# Patient Record
Sex: MALE | Race: WHITE | HISPANIC OR LATINO | URBAN - METROPOLITAN AREA
[De-identification: names, ages, dates, MRNs, and addresses within clinical notes are randomized per-mention and may not be internally consistent; named-entity substitution may affect disease eponyms.]

---

## 2017-03-11 PROCEDURE — 99285 EMERGENCY DEPT VISIT HI MDM: CPT

## 2017-03-11 PROCEDURE — 71250 CT THORAX DX C-: CPT | Mod: 26

## 2017-03-11 PROCEDURE — 71010: CPT | Mod: 26

## 2017-03-12 ENCOUNTER — INPATIENT (INPATIENT)
Facility: HOSPITAL | Age: 42
LOS: 9 days | Discharge: HOME | End: 2017-03-22
Payer: MEDICAID

## 2017-03-12 PROCEDURE — 93970 EXTREMITY STUDY: CPT | Mod: 26

## 2017-03-13 ENCOUNTER — OUTPATIENT (OUTPATIENT)
Dept: OUTPATIENT SERVICES | Facility: HOSPITAL | Age: 42
LOS: 1 days | End: 2017-03-13

## 2017-03-13 PROCEDURE — 71020: CPT | Mod: 26

## 2017-03-14 ENCOUNTER — OUTPATIENT (OUTPATIENT)
Dept: OUTPATIENT SERVICES | Facility: HOSPITAL | Age: 42
LOS: 1 days | End: 2017-03-14

## 2017-03-15 ENCOUNTER — OUTPATIENT (OUTPATIENT)
Dept: OUTPATIENT SERVICES | Facility: HOSPITAL | Age: 42
LOS: 1 days | End: 2017-03-15

## 2017-03-16 ENCOUNTER — OUTPATIENT (OUTPATIENT)
Dept: OUTPATIENT SERVICES | Facility: HOSPITAL | Age: 42
LOS: 1 days | End: 2017-03-16

## 2017-03-16 PROCEDURE — 71020: CPT | Mod: 26

## 2017-03-17 ENCOUNTER — OUTPATIENT (OUTPATIENT)
Dept: OUTPATIENT SERVICES | Facility: HOSPITAL | Age: 42
LOS: 1 days | End: 2017-03-17

## 2017-03-18 ENCOUNTER — OUTPATIENT (OUTPATIENT)
Dept: OUTPATIENT SERVICES | Facility: HOSPITAL | Age: 42
LOS: 1 days | End: 2017-03-18

## 2017-03-18 PROCEDURE — 71020: CPT | Mod: 26

## 2017-03-19 ENCOUNTER — OUTPATIENT (OUTPATIENT)
Dept: OUTPATIENT SERVICES | Facility: HOSPITAL | Age: 42
LOS: 1 days | End: 2017-03-19

## 2017-03-20 ENCOUNTER — OUTPATIENT (OUTPATIENT)
Dept: OUTPATIENT SERVICES | Facility: HOSPITAL | Age: 42
LOS: 1 days | End: 2017-03-20

## 2017-03-21 ENCOUNTER — OUTPATIENT (OUTPATIENT)
Dept: OUTPATIENT SERVICES | Facility: HOSPITAL | Age: 42
LOS: 1 days | End: 2017-03-21

## 2017-03-21 PROCEDURE — 71260 CT THORAX DX C+: CPT | Mod: 26

## 2017-03-21 PROCEDURE — 74177 CT ABD & PELVIS W/CONTRAST: CPT | Mod: 26

## 2017-03-22 ENCOUNTER — OUTPATIENT (OUTPATIENT)
Dept: OUTPATIENT SERVICES | Facility: HOSPITAL | Age: 42
LOS: 1 days | End: 2017-03-22

## 2017-03-22 ENCOUNTER — INPATIENT (INPATIENT)
Facility: HOSPITAL | Age: 42
LOS: 9 days | Discharge: HOME HEALTH | DRG: 163 | End: 2017-04-01
Attending: HOSPITALIST | Admitting: HOSPITALIST
Payer: MEDICAID

## 2017-03-22 VITALS
HEIGHT: 68 IN | OXYGEN SATURATION: 98 % | WEIGHT: 139.99 LBS | HEART RATE: 67 BPM | RESPIRATION RATE: 16 BRPM | SYSTOLIC BLOOD PRESSURE: 81 MMHG | DIASTOLIC BLOOD PRESSURE: 53 MMHG | TEMPERATURE: 98 F

## 2017-03-22 DIAGNOSIS — I10 ESSENTIAL (PRIMARY) HYPERTENSION: ICD-10-CM

## 2017-03-22 DIAGNOSIS — J85.2 ABSCESS OF LUNG WITHOUT PNEUMONIA: ICD-10-CM

## 2017-03-22 DIAGNOSIS — E11.9 TYPE 2 DIABETES MELLITUS WITHOUT COMPLICATIONS: ICD-10-CM

## 2017-03-22 DIAGNOSIS — J86.9 PYOTHORAX WITHOUT FISTULA: ICD-10-CM

## 2017-03-22 LAB
ALBUMIN SERPL ELPH-MCNC: 2.3 G/DL — LOW (ref 3.3–5.2)
ALP SERPL-CCNC: 207 U/L — HIGH (ref 40–120)
ALT FLD-CCNC: 143 U/L — HIGH
ANION GAP SERPL CALC-SCNC: 11 MMOL/L — SIGNIFICANT CHANGE UP (ref 5–17)
APTT BLD: 29.9 SEC — SIGNIFICANT CHANGE UP (ref 27.5–37.4)
AST SERPL-CCNC: 42 U/L — HIGH
BASOPHILS # BLD AUTO: 0 K/UL — SIGNIFICANT CHANGE UP (ref 0–0.2)
BASOPHILS NFR BLD AUTO: 0.2 % — SIGNIFICANT CHANGE UP (ref 0–2)
BILIRUB SERPL-MCNC: 0.2 MG/DL — LOW (ref 0.4–2)
BUN SERPL-MCNC: 9 MG/DL — SIGNIFICANT CHANGE UP (ref 8–20)
CALCIUM SERPL-MCNC: 8.3 MG/DL — LOW (ref 8.6–10.2)
CHLORIDE SERPL-SCNC: 99 MMOL/L — SIGNIFICANT CHANGE UP (ref 98–107)
CO2 SERPL-SCNC: 27 MMOL/L — SIGNIFICANT CHANGE UP (ref 22–29)
CREAT SERPL-MCNC: 0.48 MG/DL — LOW (ref 0.5–1.3)
EOSINOPHIL # BLD AUTO: 0 K/UL — SIGNIFICANT CHANGE UP (ref 0–0.5)
EOSINOPHIL NFR BLD AUTO: 0.5 % — SIGNIFICANT CHANGE UP (ref 0–5)
GLUCOSE SERPL-MCNC: 150 MG/DL — HIGH (ref 70–115)
HCT VFR BLD CALC: 35.9 % — LOW (ref 42–52)
HGB BLD-MCNC: 12.1 G/DL — LOW (ref 14–18)
INR BLD: 1.09 RATIO — SIGNIFICANT CHANGE UP (ref 0.88–1.16)
LYMPHOCYTES # BLD AUTO: 1.7 K/UL — SIGNIFICANT CHANGE UP (ref 1–4.8)
LYMPHOCYTES # BLD AUTO: 19.8 % — LOW (ref 20–55)
MCHC RBC-ENTMCNC: 29.5 PG — SIGNIFICANT CHANGE UP (ref 27–31)
MCHC RBC-ENTMCNC: 33.7 G/DL — SIGNIFICANT CHANGE UP (ref 32–36)
MCV RBC AUTO: 87.6 FL — SIGNIFICANT CHANGE UP (ref 80–94)
MONOCYTES # BLD AUTO: 0.5 K/UL — SIGNIFICANT CHANGE UP (ref 0–0.8)
MONOCYTES NFR BLD AUTO: 5.3 % — SIGNIFICANT CHANGE UP (ref 3–10)
NEUTROPHILS # BLD AUTO: 6.5 K/UL — SIGNIFICANT CHANGE UP (ref 1.8–8)
NEUTROPHILS NFR BLD AUTO: 73.9 % — HIGH (ref 37–73)
PLATELET # BLD AUTO: 503 K/UL — HIGH (ref 150–400)
POTASSIUM SERPL-MCNC: 3.8 MMOL/L — SIGNIFICANT CHANGE UP (ref 3.5–5.3)
POTASSIUM SERPL-SCNC: 3.8 MMOL/L — SIGNIFICANT CHANGE UP (ref 3.5–5.3)
PROT SERPL-MCNC: 8 G/DL — SIGNIFICANT CHANGE UP (ref 6.6–8.7)
PROTHROM AB SERPL-ACNC: 12 SEC — SIGNIFICANT CHANGE UP (ref 9.8–12.7)
RBC # BLD: 4.1 M/UL — LOW (ref 4.6–6.2)
RBC # FLD: 13.8 % — SIGNIFICANT CHANGE UP (ref 11–15.6)
SODIUM SERPL-SCNC: 137 MMOL/L — SIGNIFICANT CHANGE UP (ref 135–145)
WBC # BLD: 8.8 K/UL — SIGNIFICANT CHANGE UP (ref 4.8–10.8)
WBC # FLD AUTO: 8.8 K/UL — SIGNIFICANT CHANGE UP (ref 4.8–10.8)

## 2017-03-22 PROCEDURE — 99285 EMERGENCY DEPT VISIT HI MDM: CPT

## 2017-03-22 PROCEDURE — 99223 1ST HOSP IP/OBS HIGH 75: CPT

## 2017-03-22 PROCEDURE — 93010 ELECTROCARDIOGRAM REPORT: CPT

## 2017-03-22 RX ORDER — INSULIN LISPRO 100/ML
VIAL (ML) SUBCUTANEOUS
Qty: 0 | Refills: 0 | Status: DISCONTINUED | OUTPATIENT
Start: 2017-03-22 | End: 2017-03-24

## 2017-03-22 RX ORDER — GLUCAGON INJECTION, SOLUTION 0.5 MG/.1ML
1 INJECTION, SOLUTION SUBCUTANEOUS ONCE
Qty: 0 | Refills: 0 | Status: DISCONTINUED | OUTPATIENT
Start: 2017-03-22 | End: 2017-03-24

## 2017-03-22 RX ORDER — ENOXAPARIN SODIUM 100 MG/ML
40 INJECTION SUBCUTANEOUS EVERY 24 HOURS
Qty: 0 | Refills: 0 | Status: DISCONTINUED | OUTPATIENT
Start: 2017-03-22 | End: 2017-03-23

## 2017-03-22 RX ORDER — DEXTROSE 50 % IN WATER 50 %
25 SYRINGE (ML) INTRAVENOUS ONCE
Qty: 0 | Refills: 0 | Status: DISCONTINUED | OUTPATIENT
Start: 2017-03-22 | End: 2017-03-24

## 2017-03-22 RX ORDER — SODIUM CHLORIDE 9 MG/ML
1000 INJECTION, SOLUTION INTRAVENOUS
Qty: 0 | Refills: 0 | Status: DISCONTINUED | OUTPATIENT
Start: 2017-03-22 | End: 2017-03-24

## 2017-03-22 RX ORDER — DEXTROSE 50 % IN WATER 50 %
12.5 SYRINGE (ML) INTRAVENOUS ONCE
Qty: 0 | Refills: 0 | Status: DISCONTINUED | OUTPATIENT
Start: 2017-03-22 | End: 2017-03-24

## 2017-03-22 RX ORDER — SODIUM CHLORIDE 9 MG/ML
1000 INJECTION INTRAMUSCULAR; INTRAVENOUS; SUBCUTANEOUS ONCE
Qty: 0 | Refills: 0 | Status: COMPLETED | OUTPATIENT
Start: 2017-03-22 | End: 2017-03-22

## 2017-03-22 RX ORDER — DEXTROSE 50 % IN WATER 50 %
1 SYRINGE (ML) INTRAVENOUS ONCE
Qty: 0 | Refills: 0 | Status: DISCONTINUED | OUTPATIENT
Start: 2017-03-22 | End: 2017-03-24

## 2017-03-22 RX ORDER — INSULIN GLARGINE 100 [IU]/ML
20 INJECTION, SOLUTION SUBCUTANEOUS AT BEDTIME
Qty: 0 | Refills: 0 | Status: DISCONTINUED | OUTPATIENT
Start: 2017-03-22 | End: 2017-03-23

## 2017-03-22 RX ADMIN — SODIUM CHLORIDE 2000 MILLILITER(S): 9 INJECTION INTRAMUSCULAR; INTRAVENOUS; SUBCUTANEOUS at 20:25

## 2017-03-22 RX ADMIN — INSULIN GLARGINE 20 UNIT(S): 100 INJECTION, SOLUTION SUBCUTANEOUS at 21:31

## 2017-03-22 RX ADMIN — ENOXAPARIN SODIUM 40 MILLIGRAM(S): 100 INJECTION SUBCUTANEOUS at 21:31

## 2017-03-22 RX ADMIN — Medication 2: at 21:31

## 2017-03-22 NOTE — H&P ADULT - ASSESSMENT
Molly Monson [id number] pmd none. Ctcx Dr Gold Id Dr Mark  42 y/o M w/hx alcohol abuse, recent dx new onset dm. Pt was transferred from [place] due to several areas right lung abscess, and moderate size loculated empyema. Pt was tx w/vanco/meropenem... Pt states that he was admitted x 10 days, prior to his admission, he admitted to be drunk..thereafter he came w/ hx of fever same evolution, and chills, associated w. productive cough, yellowish..and pleuritic cp, everytime he coughs... he also states loosing approx 10 lbs during the past month.. Denies sob,abd pain, dysuria, palpitations, weakness, headache or any further complaints. Exam;sbp 80's RL crackles, and hypovent. ct report: persistent ext consolidation RLL, several areas of lung abscess, mod size loculated right pleural effusion/empyema. .. Started on ivf, abx. Molly Monson [id number] pmd none. Ctcx Dr Gold Id Dr Mark  40 y/o M w/hx alcohol abuse, recent dx new onset dm. Pt was transferred from [place] due to several areas right lung abscess, and moderate size loculated empyema. Pt was tx w/vanco/meropenem... Pt states that he was admitted x 10 days, prior to his admission, he admitted to be drunk..thereafter he came w/ hx of fever same evolution, and chills, associated w. productive cough, yellowish..and pleuritic cp, everytime he coughs... he also states loosing approx 10 lbs during the past month.. Denies sob,abd pain, dysuria, palpitations, weakness, headache or any further complaints. Exam;sbp 80's RL crackles, and hypovent. ct report: persistent ext consolidation RLL, several areas of lung abscess, mod size loculated right pleural effusion/empyema. .. Ctcx PA evaluated pt recommended to cont/ same abx coverage w/vanco/meropenem, repeat b.c, plan for decortication (fri).

## 2017-03-22 NOTE — H&P ADULT - HISTORY OF PRESENT ILLNESS
42 y/o M w/hx recent dx new onset dm. Pt was transferred from [place] due to several areas right lung abscess, and moderate size loculated empyema. Pt was tx w/vanco/meropenem... Pt states that he was admitted x 10 dys. he came w/ hx of fever same evolution, and chills, associated w. productive cough, yellowish..and pleuritic cp, everytime he coughs... he also states loosing approx 10 lbs during the past month.. Denies sob,abd pain, dysuria, palpitations, weakness, headache or any further complaints. 42 y/o M w/hx recent dx new onset dm. Pt was transferred from [place] due to several areas right lung abscess, and moderate size loculated empyema. Pt was tx w/vanco/meropenem... Pt states that he was admitted x 10 dys. he came w/ hx of fever same evolution, and chills, associated w. productive cough, yellowish..and pleuritic cp, everytime he coughs... he also states loosing approx 10 lbs during the past month.. Denies sob,abd pain, dysuria, palpitations, weakness, headache, diarrhea or any further complaints.

## 2017-03-22 NOTE — ED ADULT TRIAGE NOTE - CHIEF COMPLAINT QUOTE
pt alert and awake x3, BIBA from [facility] accepted by Dr Milligan hospitalist, Dr Milligan and Dr Caicedo at bedside for eval

## 2017-03-22 NOTE — H&P ADULT - PROBLEM SELECTOR PLAN 1
sepsis w/ hypotension  ID consult// Cardthx consult dr robinson randall/meropenem, f/u b.c uc.  pt sbp was found 80's. bolus ivf given, responded..

## 2017-03-22 NOTE — ED PROVIDER NOTE - RESPIRATORY, MLM
diminshes breath sounds right lung normla breath sounds left lung trachea midline no accessory muscle use

## 2017-03-22 NOTE — ED PROVIDER NOTE - OBJECTIVE STATEMENT
pt sent from [facility] for pulmonary abscess sent from floor accpeted by dr wilcox. pt currently denies fever. denies HA or neck pain. no chest pain or sob. no abd pain. no n/v/d. no urinary f/u/d. no back pain. no motor or sensory deficits. denies drug use. no recent travel. no rash. no other acute issues symptoms or concerns he has a chornic productive cough green sputum

## 2017-03-23 ENCOUNTER — RESULT REVIEW (OUTPATIENT)
Age: 42
End: 2017-03-23

## 2017-03-23 DIAGNOSIS — J85.1 ABSCESS OF LUNG WITH PNEUMONIA: ICD-10-CM

## 2017-03-23 DIAGNOSIS — E11.628 TYPE 2 DIABETES MELLITUS WITH OTHER SKIN COMPLICATIONS: ICD-10-CM

## 2017-03-23 PROBLEM — Z00.00 ENCOUNTER FOR PREVENTIVE HEALTH EXAMINATION: Status: ACTIVE | Noted: 2017-03-23

## 2017-03-23 LAB
ABO RH CONFIRMATION: SIGNIFICANT CHANGE UP
ALBUMIN SERPL ELPH-MCNC: 2.3 G/DL — LOW (ref 3.3–5.2)
ALBUMIN SERPL ELPH-MCNC: 2.3 G/DL — LOW (ref 3.3–5.2)
ALP SERPL-CCNC: 176 U/L — HIGH (ref 40–120)
ALP SERPL-CCNC: 180 U/L — HIGH (ref 40–120)
ALT FLD-CCNC: 111 U/L — HIGH
ALT FLD-CCNC: 111 U/L — HIGH
ANION GAP SERPL CALC-SCNC: 11 MMOL/L — SIGNIFICANT CHANGE UP (ref 5–17)
APPEARANCE UR: CLEAR — SIGNIFICANT CHANGE UP
AST SERPL-CCNC: 30 U/L — SIGNIFICANT CHANGE UP
AST SERPL-CCNC: 30 U/L — SIGNIFICANT CHANGE UP
BASOPHILS # BLD AUTO: 0 K/UL — SIGNIFICANT CHANGE UP (ref 0–0.2)
BASOPHILS NFR BLD AUTO: 0.2 % — SIGNIFICANT CHANGE UP (ref 0–2)
BILIRUB DIRECT SERPL-MCNC: 0 MG/DL — SIGNIFICANT CHANGE UP (ref 0–0.3)
BILIRUB INDIRECT FLD-MCNC: 0.2 MG/DL — SIGNIFICANT CHANGE UP (ref 0.2–1)
BILIRUB SERPL-MCNC: 0.2 MG/DL — LOW (ref 0.4–2)
BILIRUB SERPL-MCNC: 0.2 MG/DL — LOW (ref 0.4–2)
BILIRUB UR-MCNC: NEGATIVE — SIGNIFICANT CHANGE UP
BLD GP AB SCN SERPL QL: SIGNIFICANT CHANGE UP
BUN SERPL-MCNC: 9 MG/DL — SIGNIFICANT CHANGE UP (ref 8–20)
CA-I BLD-SCNC: 1.17 MMOL/L — SIGNIFICANT CHANGE UP (ref 1.05–1.34)
CALCIUM SERPL-MCNC: 7.8 MG/DL — LOW (ref 8.6–10.2)
CHLORIDE SERPL-SCNC: 103 MMOL/L — SIGNIFICANT CHANGE UP (ref 98–107)
CO2 SERPL-SCNC: 23 MMOL/L — SIGNIFICANT CHANGE UP (ref 22–29)
COLOR SPEC: YELLOW — SIGNIFICANT CHANGE UP
CREAT SERPL-MCNC: 0.47 MG/DL — LOW (ref 0.5–1.3)
DIFF PNL FLD: NEGATIVE — SIGNIFICANT CHANGE UP
EOSINOPHIL # BLD AUTO: 0.1 K/UL — SIGNIFICANT CHANGE UP (ref 0–0.5)
EOSINOPHIL NFR BLD AUTO: 0.9 % — SIGNIFICANT CHANGE UP (ref 0–5)
GLUCOSE SERPL-MCNC: 164 MG/DL — HIGH (ref 70–115)
GLUCOSE UR QL: NEGATIVE MG/DL — SIGNIFICANT CHANGE UP
GRAM STN FLD: SIGNIFICANT CHANGE UP
HBA1C BLD-MCNC: 12.3 % — HIGH (ref 4–5.6)
HCT VFR BLD CALC: 33.4 % — LOW (ref 42–52)
HCV AB S/CO SERPL IA: 0.14 S/CO — SIGNIFICANT CHANGE UP
HCV AB SERPL-IMP: SIGNIFICANT CHANGE UP
HGB BLD-MCNC: 11.1 G/DL — LOW (ref 14–18)
HIV 1 & 2 AB SERPL IA.RAPID: SIGNIFICANT CHANGE UP
KETONES UR-MCNC: NEGATIVE — SIGNIFICANT CHANGE UP
LACTATE BLDV-MCNC: 1.1 MMOL/L — SIGNIFICANT CHANGE UP (ref 0.5–1.6)
LEUKOCYTE ESTERASE UR-ACNC: NEGATIVE — SIGNIFICANT CHANGE UP
LYMPHOCYTES # BLD AUTO: 2.4 K/UL — SIGNIFICANT CHANGE UP (ref 1–4.8)
LYMPHOCYTES # BLD AUTO: 29.7 % — SIGNIFICANT CHANGE UP (ref 20–55)
MAGNESIUM SERPL-MCNC: 2 MG/DL — SIGNIFICANT CHANGE UP (ref 1.8–2.5)
MAGNESIUM SERPL-MCNC: 2.1 MG/DL — SIGNIFICANT CHANGE UP (ref 1.8–2.5)
MCHC RBC-ENTMCNC: 29.2 PG — SIGNIFICANT CHANGE UP (ref 27–31)
MCHC RBC-ENTMCNC: 33.2 G/DL — SIGNIFICANT CHANGE UP (ref 32–36)
MCV RBC AUTO: 87.9 FL — SIGNIFICANT CHANGE UP (ref 80–94)
MONOCYTES # BLD AUTO: 0.5 K/UL — SIGNIFICANT CHANGE UP (ref 0–0.8)
MONOCYTES NFR BLD AUTO: 5.8 % — SIGNIFICANT CHANGE UP (ref 3–10)
NEUTROPHILS # BLD AUTO: 5.2 K/UL — SIGNIFICANT CHANGE UP (ref 1.8–8)
NEUTROPHILS NFR BLD AUTO: 63 % — SIGNIFICANT CHANGE UP (ref 37–73)
NITRITE UR-MCNC: NEGATIVE — SIGNIFICANT CHANGE UP
PH UR: 7 — SIGNIFICANT CHANGE UP (ref 4.8–8)
PHOSPHATE SERPL-MCNC: 2.8 MG/DL — SIGNIFICANT CHANGE UP (ref 2.4–4.7)
PLATELET # BLD AUTO: 473 K/UL — HIGH (ref 150–400)
POTASSIUM SERPL-MCNC: 4 MMOL/L — SIGNIFICANT CHANGE UP (ref 3.5–5.3)
POTASSIUM SERPL-SCNC: 4 MMOL/L — SIGNIFICANT CHANGE UP (ref 3.5–5.3)
PROT SERPL-MCNC: 7.2 G/DL — SIGNIFICANT CHANGE UP (ref 6.6–8.7)
PROT SERPL-MCNC: 7.3 G/DL — SIGNIFICANT CHANGE UP (ref 6.6–8.7)
PROT UR-MCNC: NEGATIVE MG/DL — SIGNIFICANT CHANGE UP
RBC # BLD: 3.8 M/UL — LOW (ref 4.6–6.2)
RBC # FLD: 13.9 % — SIGNIFICANT CHANGE UP (ref 11–15.6)
SODIUM SERPL-SCNC: 137 MMOL/L — SIGNIFICANT CHANGE UP (ref 135–145)
SP GR SPEC: 1 — LOW (ref 1.01–1.02)
SPECIMEN SOURCE: SIGNIFICANT CHANGE UP
TYPE + AB SCN PNL BLD: SIGNIFICANT CHANGE UP
UROBILINOGEN FLD QL: NEGATIVE MG/DL — SIGNIFICANT CHANGE UP
WBC # BLD: 8.2 K/UL — SIGNIFICANT CHANGE UP (ref 4.8–10.8)
WBC # FLD AUTO: 8.2 K/UL — SIGNIFICANT CHANGE UP (ref 4.8–10.8)

## 2017-03-23 PROCEDURE — 99223 1ST HOSP IP/OBS HIGH 75: CPT

## 2017-03-23 PROCEDURE — 99233 SBSQ HOSP IP/OBS HIGH 50: CPT

## 2017-03-23 PROCEDURE — 71260 CT THORAX DX C+: CPT | Mod: 26

## 2017-03-23 RX ORDER — VANCOMYCIN HCL 1 G
1000 VIAL (EA) INTRAVENOUS EVERY 8 HOURS
Qty: 0 | Refills: 0 | Status: DISCONTINUED | OUTPATIENT
Start: 2017-03-23 | End: 2017-03-24

## 2017-03-23 RX ORDER — MEROPENEM 1 G/30ML
INJECTION INTRAVENOUS
Qty: 0 | Refills: 0 | Status: DISCONTINUED | OUTPATIENT
Start: 2017-03-23 | End: 2017-03-24

## 2017-03-23 RX ORDER — ENOXAPARIN SODIUM 100 MG/ML
40 INJECTION SUBCUTANEOUS ONCE
Qty: 0 | Refills: 0 | Status: COMPLETED | OUTPATIENT
Start: 2017-03-23 | End: 2017-03-23

## 2017-03-23 RX ORDER — MEROPENEM 1 G/30ML
1000 INJECTION INTRAVENOUS EVERY 8 HOURS
Qty: 0 | Refills: 0 | Status: DISCONTINUED | OUTPATIENT
Start: 2017-03-23 | End: 2017-03-24

## 2017-03-23 RX ORDER — INSULIN GLARGINE 100 [IU]/ML
10 INJECTION, SOLUTION SUBCUTANEOUS AT BEDTIME
Qty: 0 | Refills: 0 | Status: DISCONTINUED | OUTPATIENT
Start: 2017-03-23 | End: 2017-03-24

## 2017-03-23 RX ORDER — MEROPENEM 1 G/30ML
1000 INJECTION INTRAVENOUS ONCE
Qty: 0 | Refills: 0 | Status: COMPLETED | OUTPATIENT
Start: 2017-03-23 | End: 2017-03-23

## 2017-03-23 RX ADMIN — ENOXAPARIN SODIUM 40 MILLIGRAM(S): 100 INJECTION SUBCUTANEOUS at 18:15

## 2017-03-23 RX ADMIN — Medication 250 MILLIGRAM(S): at 03:04

## 2017-03-23 RX ADMIN — Medication 4: at 18:15

## 2017-03-23 RX ADMIN — Medication 250 MILLIGRAM(S): at 18:31

## 2017-03-23 RX ADMIN — MEROPENEM 200 MILLIGRAM(S): 1 INJECTION INTRAVENOUS at 02:07

## 2017-03-23 RX ADMIN — MEROPENEM 200 MILLIGRAM(S): 1 INJECTION INTRAVENOUS at 18:31

## 2017-03-23 RX ADMIN — Medication 250 MILLIGRAM(S): at 13:35

## 2017-03-23 RX ADMIN — INSULIN GLARGINE 10 UNIT(S): 100 INJECTION, SOLUTION SUBCUTANEOUS at 21:23

## 2017-03-23 RX ADMIN — MEROPENEM 200 MILLIGRAM(S): 1 INJECTION INTRAVENOUS at 13:35

## 2017-03-23 RX ADMIN — Medication 6: at 21:24

## 2017-03-23 NOTE — CHART NOTE - NSCHARTNOTEFT_GEN_A_CORE
41 year old male with a PMH recent diagnosed new onset DM. Pt was transferred from [place] due to several areas right lung abscess & moderate size loculated empyema. Pt was treated with vano & meropenem.  Pt states that he was admitted for 10 days & he reported symptoms of fever same evolution & chills associated with productive cough, & pleuritic chest pain when he coughs.  Pt. scheduled for VATS & decortication tomorrow 3/24/17 as per Dr. Cervantes.

## 2017-03-23 NOTE — PROGRESS NOTE ADULT - SUBJECTIVE AND OBJECTIVE BOX
Patient is a 41y old  Male who presents with a chief complaint of fever, chills, cough (22 Mar 2017 20:10)  and he complains of pain in his right chest with deep breathing.  He is scheduled for a FLEXIBLE   BRONCHOSCOPY RIGHT VIDEO ASSISTED THORACIC SURGERY (VATS) DECORTICATION.  He is in an isolation room and are trying to r/o tuberculosis.    PAST MEDICAL HISTORY:  Healthy with some elevated blood sugars on this admission.      PAST SURGICAL HISTORY:  No past surgical history      MEDICATIONS  (STANDING):  insulin glargine Injectable (LANTUS) 20Unit(s) SubCutaneous at bedtime  insulin lispro (HumaLOG) corrective regimen sliding scale  SubCutaneous Before meals and at bedtime  dextrose 5%. 1000milliLiter(s) IV Continuous <Continuous>  dextrose 50% Injectable 12.5Gram(s) IV Push once  dextrose 50% Injectable 25Gram(s) IV Push once  dextrose 50% Injectable 25Gram(s) IV Push once  enoxaparin Injectable 40milliGRAM(s) SubCutaneous every 24 hours  vancomycin  IVPB 1000milliGRAM(s) IV Intermittent every 8 hours  meropenem IVPB  IV Intermittent   meropenem IVPB 1000milliGRAM(s) IV Intermittent every 8 hours    MEDICATIONS  (PRN):  dextrose Gel 1Dose(s) Oral once PRN Blood Glucose LESS THAN 70 milliGRAM(s)/deciliter  glucagon  Injectable 1milliGRAM(s) IntraMuscular once PRN Glucose LESS THAN 70 milligrams/deciliter  chlordiazePOXIDE 50milliGRAM(s) Oral every 1 hour PRN CIWA-Ar score 8 or greater      Allergies:  No Known Allergies      SOCIAL HISTORY:  The patient drinks beer occasionally but does not smoke or use illicit drugs.                          11.1   8.2   )-----------( 473      ( 23 Mar 2017 05:19 )             33.4       PT/INR - ( 22 Mar 2017 22:09 )   PT: 12.0 sec;   INR: 1.09 ratio         PTT - ( 22 Mar 2017 22:09 )  PTT:29.9 sec    23 Mar 2017 05:20    137    |  103    |  9.0    ----------------------------<  164    4.0     |  23.0   |  0.47     Ca    7.8        23 Mar 2017 05:20  Phos  2.8       23 Mar 2017 05:19  Mg     2.0       23 Mar 2017 05:19    TPro  7.3    /  Alb  2.3    /  TBili  0.2    /  DBili  x      /  AST  30     /  ALT  111    /  AlkPhos  176    23 Mar 2017 05:20      Height (cm): 167.6 (03-22 @ 22:34)  Weight (kg): 64.3 (03-22 @ 22:34)  BMI (kg/m2): 22.9 (03-22 @ 22:34)    EKG: 3/22/2017  Normal sinus rhythm  Prolonged QT  Abnormal ECG    ASA # = 2  Mallampati # =  1  (The patient has 2 loose teeth, # 7 & # 8.

## 2017-03-23 NOTE — PROGRESS NOTE ADULT - SUBJECTIVE AND OBJECTIVE BOX
LYNN MELISSA   ------------------------------  The patient was seen and evaluated for lung abscess.  The patient is in no acute distress.  Denied any chest pain, palpitations, shortness of breath, abdominal pain.  Mild pleuritic pain while coughing.    Vital Signs Last 24 Hrs  T(C): 37.1, Max: 37.1 ( @ 08:00)  T(F): 98.8, Max: 98.8 ( @ 08:00)  HR: 82 (67 - 91)  BP: 118/62 (81/53 - 118/62)  BP(mean): --  RR: 17 (16 - 17)  SpO2: 99% (97% - 99%)    PHYSICAL EXAMINATION:  ----------------------------------------  General appearance: NAD, Awake, Alert  HEENT: NCAT, Conjunctiva clear, EOMI, Pupils reactive  Neck: Supple, No JVD, No tenderness  Lungs: Clear to auscultation, Breath sound equal bilaterally, No wheezes, No rales  Cardiovascular: S1S2, Regular rhythm  Abdomen: Soft, Nontender, Nondistended, No guarding/rebound, Positive bowel sounds  Extremities: No clubbing, No cyanosis, No edema, No calf tenderness  Neuro: Strength equal bilaterally, No tremors  Psychiatric: Awake and oriented, No anxiety    CAPILLARY BLOOD GLUCOSE  118 (23 Mar 2017 08:00)  156 (22 Mar 2017 20:07)    LABS:  ------------------------------             11.1   8.2   )-----------( 473      ( 23 Mar 2017 05:19 )             33.4     23 Mar 2017 05:20    137    |  103    |  9.0    ----------------------------<  164    4.0     |  23.0   |  0.47     Ca    7.8        23 Mar 2017 05:20  Phos  2.8       23 Mar 2017 05:19  Mg     2.0       23 Mar 2017 05:19    TPro  7.3    /  Alb  2.3    /  TBili  0.2    /  DBili  x      /  AST  30     /  ALT  111    /  AlkPhos  176    23 Mar 2017 05:20    LIVER FUNCTIONS - ( 23 Mar 2017 05:20 )  Alb: 2.3 g/dL / Pro: 7.3 g/dL / ALK PHOS: 176 U/L / ALT: 111 U/L / AST: 30 U/L / GGT: x           PT/INR - ( 22 Mar 2017 22:09 )   PT: 12.0 sec;   INR: 1.09 ratio    PTT - ( 22 Mar 2017 22:09 )  PTT:29.9 sec    Urinalysis Basic - ( 23 Mar 2017 00:44 )  Color: Yellow / Appearance: Clear / S.005 / pH: x  Gluc: x / Ketone: Negative  / Bili: Negative / Urobili: Negative mg/dL   Blood: x / Protein: Negative mg/dL / Nitrite: Negative   Leuk Esterase: Negative / RBC: x / WBC x   Sq Epi: x / Non Sq Epi: x / Bacteria: x    MEDICATIONS  (STANDING):  insulin glargine Injectable (LANTUS) 20Unit(s) SubCutaneous at bedtime  insulin lispro (HumaLOG) corrective regimen sliding scale  SubCutaneous Before meals and at bedtime  dextrose 5%. 1000milliLiter(s) IV Continuous <Continuous>  dextrose 50% Injectable 12.5Gram(s) IV Push once  dextrose 50% Injectable 25Gram(s) IV Push once  dextrose 50% Injectable 25Gram(s) IV Push once  enoxaparin Injectable 40milliGRAM(s) SubCutaneous every 24 hours  vancomycin  IVPB 1000milliGRAM(s) IV Intermittent every 8 hours  meropenem IVPB  IV Intermittent   meropenem IVPB 1000milliGRAM(s) IV Intermittent every 8 hours    MEDICATIONS  (PRN):  dextrose Gel 1Dose(s) Oral once PRN Blood Glucose LESS THAN 70 milliGRAM(s)/deciliter  glucagon  Injectable 1milliGRAM(s) IntraMuscular once PRN Glucose LESS THAN 70 milligrams/deciliter  chlordiazePOXIDE 50milliGRAM(s) Oral every 1 hour PRN CIWA-Ar score 8 or greater      ASSESSMENT / PLAN:  -----------------------------------  Lung abscess - Infectious Disease and Cardiothoracic Surgery consultation noted. Reported to have had negative AFB at [facility]. On intravenous antibiotics. Cultures and QuantiFeron Gold pending.    Diabetes - Insulin coverage, close monitoring of blood glucose levels.    HTN - Close blood pressure monitoring.

## 2017-03-23 NOTE — CONSULT NOTE ADULT - ASSESSMENT
FOR VATS FOR POSS EMPYEMA  LIMITED HX AVAILABLE  UNCLEAR IF LUNG ABSCEESS FROM POOR TEETH AND OR ASPIRATION  ? IF TB FULLY R/O - HX OF NEG AFB X 3 FROM PULM MD AT [place]  ? IF QF GOLD DONE  CONT IV ABS  CHK NEW CT AND QF GOLD  OK FOR VATS WITH THREE NEG AFB BUT DOES NOT R/O TB EMPYEMA  D/W DR. BUNN RE WAITING TILL QF GOLD BACK BEFORE OR BUT WITH THREE NEG AFB, PT NOT INFECTIOUS  IF VATS NEEDED THEN OK TO Proceed  IF NOT URGENT WOULD WAIT TILL QF GOLD BACK ALTHOUGH SURGERY IS STILL INDICATED  APPROP ISOLATION REQUIRED NOW AND IN OR TILL TB R/O.  I SEE NO ABSOLUTE CONTRA INDICATION FOR PROCEEDING WITH VATS FOR VATS FOR POSS EMPYEMA  LIMITED HX AVAILABLE  UNCLEAR IF LUNG ABSCEESS FROM POOR TEETH AND OR ASPIRATION  ? IF TB FULLY R/O - HX OF NEG AFB X 3 FROM PULM MD AT [place]  ? IF QF GOLD DONE  CT [place] WITH BOTH ABSCESS AND EMPYEMA  CONT IV ABS  CHK NEW CT AND QF GOLD  OK FOR VATS WITH THREE NEG AFB BUT DOES NOT R/O TB EMPYEMA  D/W DR. BUNN RE WAITING TILL QF GOLD BACK BEFORE OR BUT WITH THREE NEG AFB, PT NOT INFECTIOUS  IF VATS NEEDED THEN OK TO Proceed  IF NOT URGENT WOULD WAIT TILL QF GOLD BACK ALTHOUGH SURGERY IS STILL INDICATED  APPROP ISOLATION REQUIRED NOW AND IN OR TILL TB R/O.  I SEE NO ABSOLUTE CONTRA INDICATION FOR PROCEEDING WITH VATS

## 2017-03-23 NOTE — CONSULT NOTE ADULT - SUBJECTIVE AND OBJECTIVE BOX
[facility] INFECTIOUS DISEASES AND INTERNAL MEDICINE OF [employer] SARAH WHITTINGTON MD FACP   LITTLE RICHTER MD  Diplomates American Board of Internal Medicine and Infecctious Diseases      MRN-[id number]  LYNN MELISSA is a 41y  Male     CC:  HX WITH .   TRANSF FROM [facility]  NO RECORSDS AVAILABLE AND HX IN CHART SPOTTY  APPARWENTLY PRESENTED FEELING POORLY AND HOSP 11 DAYS ON IV ABS WITH LUNG ABSCSS/EMPYEMA  DR. BUNN TOLD AFB X 3 NEG BUT ? QF GOLD  PT DENIES FH TB OR PERSONAL HX TB  IN [facility] FROM [facility] SINCE   ADMITTED WITH CACHEXIA AND WEIGHT LOSS  NO OTHER HX AVAILABLE  NO LABS/CT AVAILABLE BENJAMIN  FOR VATS    Past Medical & Surgical Hx:  PAST MEDICAL & SURGICAL HISTORY:  Diabetes  No significant past surgical history      Problem List:  HEALTH ISSUES - PROBLEM Dx:  Hypertension: Hypertension  Diabetes: Diabetes  Empyema lung: Empyema lung  Pulmonary abscess: Pulmonary abscess            Allergies    No Known Allergies    Intolerances          ANTIBIOTICS:   vancomycin  IVPB 1000milliGRAM(s) IV Intermittent every 8 hours  meropenem IVPB  IV Intermittent   meropenem IVPB 1000milliGRAM(s) IV Intermittent every 8 hours       Review of Systems: - Negative except as mentioned below  NO SPUTA    Physical Exam:    Vital Signs Last 24 Hrs  T(C): 37.1, Max: 37.1 ( @ 08:00)  T(F): 98.8, Max: 98.8 ( @ 08:00)  HR: 82 (67 - 91)  BP: 118/62 (81/53 - 118/62)  BP(mean): --  RR: 17 (16 - 17)  SpO2: 99% (97% - 99%)    Height (cm): 167.6 ( @ 22:34)  Weight (kg): 64.3 ( @ 22:34)  BMI (kg/m2): 22.9 ( @ 22:34)  BSA (m2): 1.73 ( @ 22:34)    GEN: NAD, pleasantCACJECTIC  HEENT: normocephalic and atraumatic. EOMI. NANCY. Moist mucosa. Clear Posterior pharynx.  NECK: Supple. No carotid bruits.  No lymphadenopathy or thyromegaly.BAD TEETH  LUNGS: Clear to auscultation.DECR BSS R  HEART: Regular rate and rhythm without murmur.  ABDOMEN: Soft, nontender, and nondistended.  Positive bowel sounds.  No hepatosplenomegaly was noted.  EXTREMITIES: Without any cyanosis, clubbing, rash, lesions or edema.  NEUROLOGIC: Cranial nerves II through XII are grossly intact.  MUSCULOSKELETAL:ALERT NO LN  SKIN: No ulceration or induration present.      Labs:                        11.1   8.2   )-----------( 473      ( 23 Mar 2017 05:19 )             33.4     23 Mar 2017 05:20    137    |  103    |  9.0    ----------------------------<  164    4.0     |  23.0   |  0.47     Ca    7.8        23 Mar 2017 05:20  Phos  2.8       23 Mar 2017 05:19  Mg     2.0       23 Mar 2017 05:19    TPro  7.3    /  Alb  2.3    /  TBili  0.2    /  DBili  x      /  AST  30     /  ALT  111    /  AlkPhos  176    23 Mar 2017 05:20    PT/INR - ( 22 Mar 2017 22:09 )   PT: 12.0 sec;   INR: 1.09 ratio         PTT - ( 22 Mar 2017 22:09 )  PTT:29.9 sec  Urinalysis Basic - ( 23 Mar 2017 00:44 )    Color: Yellow / Appearance: Clear / S.005 / pH: x  Gluc: x / Ketone: Negative  / Bili: Negative / Urobili: Negative mg/dL   Blood: x / Protein: Negative mg/dL / Nitrite: Negative   Leuk Esterase: Negative / RBC: x / WBC x   Sq Epi: x / Non Sq Epi: x / Bacteria: x        Hematocrit: 33.4 % ( @ 05:19)  Platelet Count - Automated: 473 K/uL ( @ 05:19)  Hemoglobin: 11.1 g/dL ( @ 05:19)  WBC Count: 8.2 K/uL ( @ 05:19)  Hematocrit: 35.9 % ( @ 22:09)  Platelet Count - Automated: 503 K/uL ( @ 22:09)  Hemoglobin: 12.1 g/dL ( @ 22:09)  WBC Count: 8.8 K/uL ( @ 22:09)    Potassium, Serum: 4.0 mmol/L ( @ 05:20)  Blood Urea Nitrogen, Serum: 9.0 mg/dL ( @ 05:20)  Sodium, Serum: 137 mmol/L ( @ 05:20)  Potassium, Serum: 3.8 mmol/L ( @ 22:09)  Blood Urea Nitrogen, Serum: 9.0 mg/dL ( @ 22:09)  Sodium, Serum: 137 mmol/L ( @ 22:09)          MICROBIOLOGY        RADIOLOGY  PENDING            RALEIGH WHITTINGTON MD FACP [facility] INFECTIOUS DISEASES AND INTERNAL MEDICINE OF [place] SARAH WHITTINGTON MD FACP   LITTLE RICHTER MD  Diplomates American Board of Internal Medicine and Infecctious Diseases      MRN-[id number]  LYNN MELISSA is a 41y  Male     CC:  HX WITH .   TRANSF FROM [facility]  NO RECORSDS AVAILABLE ANDCT REPORT AVAILABLE  APPARWENTLY PRESENTED FEELING POORLY AND HOSP 11 DAYS ON IV ABS WITH LUNG ABSCSS/EMPYEMA  DR. BUNN TOLD AFB X 3 NEG BUT ? QF GOLD  PT DENIES FH TB OR PERSONAL HX TB  IN [facility] FROM [facility] SINCE   ADMITTED WITH CACHEXIA AND WEIGHT LOSS  NO OTHER HX AVAILABLE  /CT AVAILABLE NOW ONN PACS  FOR VATS    Past Medical & Surgical Hx:  PAST MEDICAL & SURGICAL HISTORY:  Diabetes  No significant past surgical history      Problem List:  HEALTH ISSUES - PROBLEM Dx:  Hypertension: Hypertension  Diabetes: Diabetes  Empyema lung: Empyema lung  Pulmonary abscess: Pulmonary abscess            Allergies    No Known Allergies    Intolerances          ANTIBIOTICS:   vancomycin  IVPB 1000milliGRAM(s) IV Intermittent every 8 hours  meropenem IVPB  IV Intermittent   meropenem IVPB 1000milliGRAM(s) IV Intermittent every 8 hours       Review of Systems: - Negative except as mentioned below  NO SPUTA    Physical Exam:    Vital Signs Last 24 Hrs  T(C): 37.1, Max: 37.1 ( @ 08:00)  T(F): 98.8, Max: 98.8 ( @ 08:00)  HR: 82 (67 - 91)  BP: 118/62 (81/53 - 118/62)  BP(mean): --  RR: 17 (16 - 17)  SpO2: 99% (97% - 99%)    Height (cm): 167.6 ( @ 22:34)  Weight (kg): 64.3 ( @ 22:34)  BMI (kg/m2): 22.9 ( @ 22:34)  BSA (m2): 1.73 ( @ 22:34)    GEN: NAD, pleasantCACJECTIC  HEENT: normocephalic and atraumatic. EOMI. NANCY. Moist mucosa. Clear Posterior pharynx.  NECK: Supple. No carotid bruits.  No lymphadenopathy or thyromegaly.BAD TEETH  LUNGS: Clear to auscultation.DECR BSS R  HEART: Regular rate and rhythm without murmur.  ABDOMEN: Soft, nontender, and nondistended.  Positive bowel sounds.  No hepatosplenomegaly was noted.  EXTREMITIES: Without any cyanosis, clubbing, rash, lesions or edema.  NEUROLOGIC: Cranial nerves II through XII are grossly intact.  MUSCULOSKELETAL:ALERT NO LN  SKIN: No ulceration or induration present.      Labs:                        11.1   8.2   )-----------( 473      ( 23 Mar 2017 05:19 )             33.4     23 Mar 2017 05:20    137    |  103    |  9.0    ----------------------------<  164    4.0     |  23.0   |  0.47     Ca    7.8        23 Mar 2017 05:20  Phos  2.8       23 Mar 2017 05:19  Mg     2.0       23 Mar 2017 05:19    TPro  7.3    /  Alb  2.3    /  TBili  0.2    /  DBili  x      /  AST  30     /  ALT  111    /  AlkPhos  176    23 Mar 2017 05:20    PT/INR - ( 22 Mar 2017 22:09 )   PT: 12.0 sec;   INR: 1.09 ratio         PTT - ( 22 Mar 2017 22:09 )  PTT:29.9 sec  Urinalysis Basic - ( 23 Mar 2017 00:44 )    Color: Yellow / Appearance: Clear / S.005 / pH: x  Gluc: x / Ketone: Negative  / Bili: Negative / Urobili: Negative mg/dL   Blood: x / Protein: Negative mg/dL / Nitrite: Negative   Leuk Esterase: Negative / RBC: x / WBC x   Sq Epi: x / Non Sq Epi: x / Bacteria: x        Hematocrit: 33.4 % ( @ 05:19)  Platelet Count - Automated: 473 K/uL ( @ 05:19)  Hemoglobin: 11.1 g/dL ( @ 05:19)  WBC Count: 8.2 K/uL ( @ 05:19)  Hematocrit: 35.9 % ( @ 22:09)  Platelet Count - Automated: 503 K/uL ( @ 22:09)  Hemoglobin: 12.1 g/dL ( @ 22:09)  WBC Count: 8.8 K/uL ( @ 22:09)    Potassium, Serum: 4.0 mmol/L ( @ 05:20)  Blood Urea Nitrogen, Serum: 9.0 mg/dL ( @ 05:20)  Sodium, Serum: 137 mmol/L ( @ 05:20)  Potassium, Serum: 3.8 mmol/L ( @ 22:09)  Blood Urea Nitrogen, Serum: 9.0 mg/dL ( @ 22:09)  Sodium, Serum: 137 mmol/L ( @ 22:09)          MICROBIOLOGY        RADIOLOGY  TECHNIQUE: Contiguous axial 3 mm sections were obtained through the chest,  abdomen and pelvis using single helical acquisition. Images were acquired  during the rapid bolus administration of 95 cc of Optiray-320 / 5 cc  discarded. Imaging post processing software was employed to generate  reformatted images in 3 mm sagittal and coronal imaging planes. Oral  contrast was administered. This scan was performed using automatic  exposure control (radiation dose reduction software) to obtain a diagnostic  image quality scan with patient dose as low as reasonably achievable.    FINDINGS : Prior CT of the chest dated 3/11/17 is available for review.    The thyroid gland appears intact.    The lungs are remarkable for persistent extensive consolidation in the right  lower lobe compatible with pneumonia. Several areas of lung abscess  formation are seen with in the consolidated lung. There is a moderate size  loculated right pleural effusion with an enhancing rind and suspicious for  empyema. The left lung is clear.    No enlarged axillary, hilar or mediastinal lymph nodes are found. The  heart size is normal. The chest wall and thoracic spine are unremarkable.    The liver demonstrates homogeneous attenuation without focal lesion or  abnormal enhancement. Hepatic size and contours are maintained. Hepatic and  portal veins are patent and not displaced. No intrahepatic or common ductal  dilatation is recognized. The gallbladder is intact without calcified  calculi or wall thickening. The pancreas is intact without ductal  dilatation or focal lesion. The spleen is normal in size.    The adrenal glands are intact. The kidneys demonstrate symmetric  nephrograms. No suspicious renal mass is found. No renal calculus,  hydronephrosis or perinephric infiltration is found. The ureters are not  dilated. The bladder appears unremarkable.        No enlarged lymph nodes are found. No ascites is present. The osseous  structures are intact.    The bowel is remarkable for distended colon filled with fecal material.. No  small bowel obstruction, perforation or abscess is recognized.  IMPRESSION:    1. Chest: Persistent consolidation of the right lower lobe with areas  of abscess formation. Moderate size loculated right pleural effusion with  enhancing rind suspicious for empyema.    2. Abdomen and pelvis: Distended colon filled with fecal material.              RALEIGH WHITTINGTON MD FACP

## 2017-03-24 ENCOUNTER — APPOINTMENT (OUTPATIENT)
Dept: THORACIC SURGERY | Facility: HOSPITAL | Age: 42
End: 2017-03-24
Payer: MEDICAID

## 2017-03-24 DIAGNOSIS — A15.9 RESPIRATORY TUBERCULOSIS UNSPECIFIED: ICD-10-CM

## 2017-03-24 LAB
CULTURE RESULTS: NO GROWTH — SIGNIFICANT CHANGE UP
SPECIMEN SOURCE: SIGNIFICANT CHANGE UP
VANCOMYCIN TROUGH SERPL-MCNC: 6.9 UG/ML — LOW (ref 10–20)

## 2017-03-24 PROCEDURE — 31622 DX BRONCHOSCOPE/WASH: CPT

## 2017-03-24 PROCEDURE — 99233 SBSQ HOSP IP/OBS HIGH 50: CPT

## 2017-03-24 PROCEDURE — 32652 THORACOSCOPY REM TOTL CORTEX: CPT

## 2017-03-24 PROCEDURE — 71010: CPT | Mod: 26

## 2017-03-24 PROCEDURE — 88305 TISSUE EXAM BY PATHOLOGIST: CPT | Mod: 26

## 2017-03-24 PROCEDURE — 32652 THORACOSCOPY REM TOTL CORTEX: CPT | Mod: AS

## 2017-03-24 RX ORDER — ENOXAPARIN SODIUM 100 MG/ML
40 INJECTION SUBCUTANEOUS EVERY 24 HOURS
Qty: 0 | Refills: 0 | Status: DISCONTINUED | OUTPATIENT
Start: 2017-03-25 | End: 2017-04-01

## 2017-03-24 RX ORDER — PYRAZINAMIDE 500 MG/1
1500 TABLET ORAL DAILY
Qty: 0 | Refills: 0 | Status: CANCELLED | OUTPATIENT
Start: 2017-03-25 | End: 2017-03-24

## 2017-03-24 RX ORDER — SODIUM CHLORIDE 9 MG/ML
500 INJECTION, SOLUTION INTRAVENOUS ONCE
Qty: 0 | Refills: 0 | Status: DISCONTINUED | OUTPATIENT
Start: 2017-03-24 | End: 2017-03-27

## 2017-03-24 RX ORDER — PHENYLEPHRINE HYDROCHLORIDE 10 MG/ML
0.5 INJECTION INTRAVENOUS
Qty: 80 | Refills: 0 | Status: DISCONTINUED | OUTPATIENT
Start: 2017-03-24 | End: 2017-03-27

## 2017-03-24 RX ORDER — ETHAMBUTOL HYDROCHLORIDE 400 MG/1
1200 TABLET, FILM COATED ORAL DAILY
Qty: 0 | Refills: 0 | Status: CANCELLED | OUTPATIENT
Start: 2017-03-25 | End: 2017-03-24

## 2017-03-24 RX ORDER — HEXAVITAMINS
300 TABLET ORAL DAILY
Qty: 0 | Refills: 0 | Status: DISCONTINUED | OUTPATIENT
Start: 2017-03-25 | End: 2017-03-26

## 2017-03-24 RX ORDER — DEXTROSE 50 % IN WATER 50 %
12.5 SYRINGE (ML) INTRAVENOUS ONCE
Qty: 0 | Refills: 0 | Status: DISCONTINUED | OUTPATIENT
Start: 2017-03-24 | End: 2017-03-25

## 2017-03-24 RX ORDER — INSULIN GLARGINE 100 [IU]/ML
10 INJECTION, SOLUTION SUBCUTANEOUS AT BEDTIME
Qty: 0 | Refills: 0 | Status: DISCONTINUED | OUTPATIENT
Start: 2017-03-25 | End: 2017-03-28

## 2017-03-24 RX ORDER — ETHAMBUTOL HYDROCHLORIDE 400 MG/1
1200 TABLET, FILM COATED ORAL DAILY
Qty: 0 | Refills: 0 | Status: DISCONTINUED | OUTPATIENT
Start: 2017-03-25 | End: 2017-03-26

## 2017-03-24 RX ORDER — INSULIN LISPRO 100/ML
2 VIAL (ML) SUBCUTANEOUS ONCE
Qty: 0 | Refills: 0 | Status: COMPLETED | OUTPATIENT
Start: 2017-03-24 | End: 2017-03-24

## 2017-03-24 RX ORDER — DOCUSATE SODIUM 100 MG
100 CAPSULE ORAL THREE TIMES A DAY
Qty: 0 | Refills: 0 | Status: DISCONTINUED | OUTPATIENT
Start: 2017-03-24 | End: 2017-04-01

## 2017-03-24 RX ORDER — INSULIN LISPRO 100/ML
VIAL (ML) SUBCUTANEOUS
Qty: 0 | Refills: 0 | Status: DISCONTINUED | OUTPATIENT
Start: 2017-03-24 | End: 2017-03-26

## 2017-03-24 RX ORDER — KETOROLAC TROMETHAMINE 30 MG/ML
30 SYRINGE (ML) INJECTION EVERY 8 HOURS
Qty: 0 | Refills: 0 | Status: DISCONTINUED | OUTPATIENT
Start: 2017-03-24 | End: 2017-03-26

## 2017-03-24 RX ORDER — PYRAZINAMIDE 500 MG/1
1500 TABLET ORAL DAILY
Qty: 0 | Refills: 0 | Status: DISCONTINUED | OUTPATIENT
Start: 2017-03-25 | End: 2017-03-26

## 2017-03-24 RX ORDER — GLUCAGON INJECTION, SOLUTION 0.5 MG/.1ML
1 INJECTION, SOLUTION SUBCUTANEOUS ONCE
Qty: 0 | Refills: 0 | Status: DISCONTINUED | OUTPATIENT
Start: 2017-03-24 | End: 2017-03-25

## 2017-03-24 RX ORDER — DEXTROSE 50 % IN WATER 50 %
1 SYRINGE (ML) INTRAVENOUS ONCE
Qty: 0 | Refills: 0 | Status: DISCONTINUED | OUTPATIENT
Start: 2017-03-24 | End: 2017-03-25

## 2017-03-24 RX ORDER — DEXTROSE 50 % IN WATER 50 %
25 SYRINGE (ML) INTRAVENOUS ONCE
Qty: 0 | Refills: 0 | Status: DISCONTINUED | OUTPATIENT
Start: 2017-03-24 | End: 2017-03-25

## 2017-03-24 RX ORDER — MEROPENEM 1 G/30ML
1000 INJECTION INTRAVENOUS EVERY 8 HOURS
Qty: 0 | Refills: 0 | Status: DISCONTINUED | OUTPATIENT
Start: 2017-03-24 | End: 2017-03-28

## 2017-03-24 RX ORDER — SODIUM CHLORIDE 9 MG/ML
1000 INJECTION, SOLUTION INTRAVENOUS
Qty: 0 | Refills: 0 | Status: DISCONTINUED | OUTPATIENT
Start: 2017-03-24 | End: 2017-03-28

## 2017-03-24 RX ORDER — SODIUM CHLORIDE 9 MG/ML
1000 INJECTION, SOLUTION INTRAVENOUS
Qty: 0 | Refills: 0 | Status: DISCONTINUED | OUTPATIENT
Start: 2017-03-24 | End: 2017-03-25

## 2017-03-24 RX ORDER — HEXAVITAMINS
300 TABLET ORAL DAILY
Qty: 0 | Refills: 0 | Status: CANCELLED | OUTPATIENT
Start: 2017-03-25 | End: 2017-03-24

## 2017-03-24 RX ADMIN — Medication 2 UNIT(S): at 07:11

## 2017-03-24 RX ADMIN — Medication 30 MILLIGRAM(S): at 20:30

## 2017-03-24 RX ADMIN — PHENYLEPHRINE HYDROCHLORIDE 12.06 MICROGRAM(S)/KG/MIN: 10 INJECTION INTRAVENOUS at 19:31

## 2017-03-24 RX ADMIN — MEROPENEM 200 MILLIGRAM(S): 1 INJECTION INTRAVENOUS at 12:39

## 2017-03-24 RX ADMIN — MEROPENEM 200 MILLIGRAM(S): 1 INJECTION INTRAVENOUS at 22:12

## 2017-03-24 RX ADMIN — MEROPENEM 200 MILLIGRAM(S): 1 INJECTION INTRAVENOUS at 02:01

## 2017-03-24 RX ADMIN — Medication 250 MILLIGRAM(S): at 04:33

## 2017-03-24 RX ADMIN — Medication 100 MILLIGRAM(S): at 22:12

## 2017-03-24 RX ADMIN — Medication 30 MILLIGRAM(S): at 22:00

## 2017-03-24 NOTE — PROGRESS NOTE ADULT - ASSESSMENT
41M who originally presented to [facility] for pneumonia and was found to have lung abscesses requiring transfer to [facility] for further management. The patient was continued on intravenous antibiotics for empyema. He was seen by Cardiothoracic Surgery in consultation with recommendation to pursue surgical intervention and possibly decortication. The patient was seen by Infectious Disease in consultation and continued on intravenous antibiotics. CT of the chest noted no evidence of mediastinal or hilar lymphadenopathy or pericardial effusion, noted a loculated right pleural effusion, pleural thickening surrounding the loculated effusion, consolidation of the right lower lobe, cavitation within the consolidated right lower lobe with small fluid pockets within the lung. HIV screen was noted to be negative and the patient was placed in isolation while awaiting further evaluation for possible tuberculosis.

## 2017-03-24 NOTE — PROGRESS NOTE ADULT - SUBJECTIVE AND OBJECTIVE BOX
LYNN MELISSA   ------------------------------  The patient was seen and evaluated for lung abscess.  The patient is in no acute distress.  Denied any chest pain, palpitations, shortness of breath, abdominal pain.  Reports chest pain with coughing. Denied hemoptysis.    Vital Signs Last 24 Hrs  T(C): 36.9, Max: 37.2 ( @ 04:34)  T(F): 98.4, Max: 99 ( @ 04:34)  HR: 80 (56 - 87)  BP: 96/69 (92/60 - 122/70)  BP(mean): 77 (77 - 77)  RR: 17 (12 - 18)  SpO2: 100% (80% - 100%)    PHYSICAL EXAMINATION:  ----------------------------------------  General appearance: NAD, Awake, Alert  HEENT: NCAT, Conjunctiva clear, EOMI, Pupils reactive  Neck: Supple, No JVD, No tenderness  Lungs: Clear to auscultation, Breath sound equal bilaterally, No wheezes, No rales  Cardiovascular: S1S2, Regular rhythm  Abdomen: Soft, Nontender, Nondistended, No guarding/rebound, Positive bowel sounds  Extremities: No clubbing, No cyanosis, No edema, No calf tenderness  Neuro: Strength equal bilaterally, No tremors  Psychiatric: Awake and oriented, No anxiety    CAPILLARY BLOOD GLUCOSE  225 (24 Mar 2017 06:08)  225 (24 Mar 2017 01:17)  260 (23 Mar 2017 20:48)    LABS:  ------------------------------             11.1   8.2   )-----------( 473      ( 23 Mar 2017 05:19 )             33.4     23 Mar 2017 05:20    137    |  103    |  9.0    ----------------------------<  164    4.0     |  23.0   |  0.47     Ca    7.8        23 Mar 2017 05:20  Phos  2.8       23 Mar 2017 05:19  Mg     2.0       23 Mar 2017 05:19    TPro  7.3    /  Alb  2.3    /  TBili  0.2    /  DBili  x      /  AST  30     /  ALT  111    /  AlkPhos  176    23 Mar 2017 05:20    LIVER FUNCTIONS - ( 23 Mar 2017 05:20 )  Alb: 2.3 g/dL / Pro: 7.3 g/dL / ALK PHOS: 176 U/L / ALT: 111 U/L / AST: 30 U/L / GGT: x           PT/INR - ( 22 Mar 2017 22:09 )   PT: 12.0 sec;   INR: 1.09 ratio    PTT - ( 22 Mar 2017 22:09 )  PTT:29.9 sec    Urinalysis Basic - ( 23 Mar 2017 00:44 )  Color: Yellow / Appearance: Clear / S.005 / pH: x  Gluc: x / Ketone: Negative  / Bili: Negative / Urobili: Negative mg/dL   Blood: x / Protein: Negative mg/dL / Nitrite: Negative   Leuk Esterase: Negative / RBC: x / WBC x   Sq Epi: x / Non Sq Epi: x / Bacteria: x    RECENT CULTURES:   .Sputum Sputum XXXX   Few White blood cells  No organisms seen XXXX    MEDICATIONS  (STANDING):  insulin lispro (HumaLOG) corrective regimen sliding scale  SubCutaneous Before meals and at bedtime  dextrose 5%. 1000milliLiter(s) IV Continuous <Continuous>  dextrose 50% Injectable 12.5Gram(s) IV Push once  dextrose 50% Injectable 25Gram(s) IV Push once  dextrose 50% Injectable 25Gram(s) IV Push once  vancomycin  IVPB 1000milliGRAM(s) IV Intermittent every 8 hours  meropenem IVPB  IV Intermittent   meropenem IVPB 1000milliGRAM(s) IV Intermittent every 8 hours  insulin glargine Injectable (LANTUS) 10Unit(s) SubCutaneous at bedtime    MEDICATIONS  (PRN):  dextrose Gel 1Dose(s) Oral once PRN Blood Glucose LESS THAN 70 milliGRAM(s)/deciliter  glucagon  Injectable 1milliGRAM(s) IntraMuscular once PRN Glucose LESS THAN 70 milligrams/deciliter  chlordiazePOXIDE 50milliGRAM(s) Oral every 1 hour PRN CIWA-Ar score 8 or greater      ASSESSMENT / PLAN:  -----------------------------------  Lung abscess - Infectious Disease and Cardiothoracic Surgery consultation noted. Reported to have had negative AFB at [facility]. On intravenous antibiotics. Cultures and QuantiFeron Gold results pending. Planned for surgery later today.    Diabetes - Insulin coverage, close monitoring of blood glucose levels.    HTN - Close blood pressure monitoring.

## 2017-03-24 NOTE — PROGRESS NOTE ADULT - ASSESSMENT
lung abscess with empyema  high risk tb    FOR OR AND VATS  INFECTION CONTROL AWARE  D/W CTS RE CULTIURE OF FLUID AND TISSUE FOR TB  NO NEED FOR VANCO  WILL START TB MEDS POST OP

## 2017-03-24 NOTE — BRIEF OPERATIVE NOTE - PROCEDURE
VATS (video-assisted thoracoscopic surgery)  03/24/2017  flex bronch, right vats, decortication  Active  [id number]

## 2017-03-24 NOTE — PROGRESS NOTE ADULT - SUBJECTIVE AND OBJECTIVE BOX
[facility] [place] SARAH WHITTINGTON MD FACP   LITTLE RICHTER MD  Diplomates American Board of Internal Medicine and Infectious Diseases      LYNN MELISSA  [id number]  41y    INTERVAL HPI/OVERNIGHT EVENTS:\  qf gold tb pis  GROSSLY ABNL CT  FOR VATS  LIKELIHOOD OF TB HIGH    Vital Signs Last 24 Hrs  T(C): 36.9, Max: 37.2 (03-24 @ 04:34)  T(F): 98.4, Max: 99 (03-24 @ 04:34)  HR: 80 (56 - 87)  BP: 96/69 (92/60 - 122/70)  BP(mean): 77 (77 - 77)  RR: 17 (12 - 18)  SpO2: 100% (80% - 100%)    ANTIBIOTICS  meropenem IVPB  IV Intermittent   meropenem IVPB 1000milliGRAM(s) IV Intermittent every 8 hours      Allergies    No Known Allergies    Intolerances        REVIEW OF SYSTEMS:    PHYSICAL EXAM:  Vital Signs Last 24 Hrs  T(C): 36.9, Max: 37.2 (03-24 @ 04:34)  T(F): 98.4, Max: 99 (03-24 @ 04:34)  HR: 80 (56 - 87)  BP: 96/69 (92/60 - 122/70)  BP(mean): 77 (77 - 77)  RR: 17 (12 - 18)  SpO2: 100% (80% - 100%)      GEN: NAD, pleasant  HEENT: normocephalic and atraumatic. EOMI. NANCY. Moist mucosa. Clear Posterior pharynx.  NECK: Supple. No carotid bruits.  No lymphadenopathy or thyromegaly.  LUNGS: Clear to auscultation.  HEART: Regular rate and rhythm without murmur.  ABDOMEN: Soft, nontender, and nondistended.  Positive bowel sounds.  No hepatosplenomegaly was noted.  EXTREMITIES: Without any cyanosis, clubbing, rash, lesions or edema.  NEUROLOGIC: Cranial nerves II through XII are grossly intact.  MUSCULOSKELETAL:  SKIN: No ulceration or induration present    LABS:                        11.1   8.2   )-----------( 473      ( 23 Mar 2017 05:19 )             33.4       23 Mar 2017 05:20    137    |  103    |  9.0    ----------------------------<  164    4.0     |  23.0   |  0.47     Ca    7.8        23 Mar 2017 05:20  Phos  2.8       23 Mar 2017 05:19  Mg     2.0       23 Mar 2017 05:19    TPro  7.3    /  Alb  2.3    /  TBili  0.2    /  DBili  x      /  AST  30     /  ALT  111    /  AlkPhos  176    23 Mar 2017 05:20        03-23 @ 05:19  hct 33.4 % hgb 11.1 g/dL    03-23 @ 05:19  plat 473 K/uL wbc 8.2 K/uL    03-22 @ 22:09  hct 35.9 % hgb 12.1 g/dL    03-22 @ 22:09  plat 503 K/uL wbc 8.8 K/uL      03-23-17  creat 0.47 mg/dL gfr 162 mL/min/1.73M2 bun 9.0 mg/dL k 4.0 mmol/L  03-22-17  creat 0.48 mg/dL gfr 161 mL/min/1.73M2 bun 9.0 mg/dL k 3.8 mmol/L      MICROBIOLOGY:  Culture Results:   No growth (03-23 @ 00:43)        RADIOLOGY & ADDITIONAL STUDIES:    IMPRESSION:      Loculated right pleural effusion, possible empyema. Right lung   consolidation with cavitation consistent with lung abscess.    No significant change since 3/21/2017..        RALEIGH WHITTINGTON MD FACP

## 2017-03-25 ENCOUNTER — TRANSCRIPTION ENCOUNTER (OUTPATIENT)
Age: 42
End: 2017-03-25

## 2017-03-25 DIAGNOSIS — Z29.9 ENCOUNTER FOR PROPHYLACTIC MEASURES, UNSPECIFIED: ICD-10-CM

## 2017-03-25 DIAGNOSIS — E46 UNSPECIFIED PROTEIN-CALORIE MALNUTRITION: ICD-10-CM

## 2017-03-25 DIAGNOSIS — I95.1 ORTHOSTATIC HYPOTENSION: ICD-10-CM

## 2017-03-25 DIAGNOSIS — I10 ESSENTIAL (PRIMARY) HYPERTENSION: ICD-10-CM

## 2017-03-25 LAB
-  AMPICILLIN/SULBACTAM: SIGNIFICANT CHANGE UP
-  CEFAZOLIN: SIGNIFICANT CHANGE UP
-  CIPROFLOXACIN: SIGNIFICANT CHANGE UP
-  CLINDAMYCIN: SIGNIFICANT CHANGE UP
-  ERYTHROMYCIN: SIGNIFICANT CHANGE UP
-  GENTAMICIN: SIGNIFICANT CHANGE UP
-  LEVOFLOXACIN: SIGNIFICANT CHANGE UP
-  MOXIFLOXACIN(AEROBIC): SIGNIFICANT CHANGE UP
-  OXACILLIN: SIGNIFICANT CHANGE UP
-  RIFAMPIN: SIGNIFICANT CHANGE UP
-  TETRACYCLINE: SIGNIFICANT CHANGE UP
-  TRIMETHOPRIM/SULFAMETHOXAZOLE: SIGNIFICANT CHANGE UP
-  VANCOMYCIN: SIGNIFICANT CHANGE UP
ANION GAP SERPL CALC-SCNC: 8 MMOL/L — SIGNIFICANT CHANGE UP (ref 5–17)
BASOPHILS # BLD AUTO: 0 K/UL — SIGNIFICANT CHANGE UP (ref 0–0.2)
BASOPHILS NFR BLD AUTO: 0.1 % — SIGNIFICANT CHANGE UP (ref 0–2)
BUN SERPL-MCNC: 8 MG/DL — SIGNIFICANT CHANGE UP (ref 8–20)
CALCIUM SERPL-MCNC: 8 MG/DL — LOW (ref 8.6–10.2)
CHLORIDE SERPL-SCNC: 97 MMOL/L — LOW (ref 98–107)
CO2 SERPL-SCNC: 27 MMOL/L — SIGNIFICANT CHANGE UP (ref 22–29)
CREAT SERPL-MCNC: 0.31 MG/DL — LOW (ref 0.5–1.3)
CULTURE RESULTS: SIGNIFICANT CHANGE UP
EOSINOPHIL # BLD AUTO: 0 K/UL — SIGNIFICANT CHANGE UP (ref 0–0.5)
EOSINOPHIL NFR BLD AUTO: 0.6 % — SIGNIFICANT CHANGE UP (ref 0–5)
GLUCOSE SERPL-MCNC: 199 MG/DL — HIGH (ref 70–115)
GRAM STN FLD: SIGNIFICANT CHANGE UP
GRAM STN FLD: SIGNIFICANT CHANGE UP
HCT VFR BLD CALC: 30.3 % — LOW (ref 42–52)
HGB BLD-MCNC: 10.1 G/DL — LOW (ref 14–18)
LYMPHOCYTES # BLD AUTO: 1.5 K/UL — SIGNIFICANT CHANGE UP (ref 1–4.8)
LYMPHOCYTES # BLD AUTO: 16.2 % — LOW (ref 20–55)
MCHC RBC-ENTMCNC: 29 PG — SIGNIFICANT CHANGE UP (ref 27–31)
MCHC RBC-ENTMCNC: 33.3 G/DL — SIGNIFICANT CHANGE UP (ref 32–36)
MCV RBC AUTO: 87.1 FL — SIGNIFICANT CHANGE UP (ref 80–94)
METHOD TYPE: SIGNIFICANT CHANGE UP
MONOCYTES # BLD AUTO: 0.4 K/UL — SIGNIFICANT CHANGE UP (ref 0–0.8)
MONOCYTES NFR BLD AUTO: 4.3 % — SIGNIFICANT CHANGE UP (ref 3–10)
NEUTROPHILS # BLD AUTO: 7.1 K/UL — SIGNIFICANT CHANGE UP (ref 1.8–8)
NEUTROPHILS NFR BLD AUTO: 78.6 % — HIGH (ref 37–73)
ORGANISM # SPEC MICROSCOPIC CNT: SIGNIFICANT CHANGE UP
ORGANISM # SPEC MICROSCOPIC CNT: SIGNIFICANT CHANGE UP
PLATELET # BLD AUTO: 389 K/UL — SIGNIFICANT CHANGE UP (ref 150–400)
POTASSIUM SERPL-MCNC: 4.1 MMOL/L — SIGNIFICANT CHANGE UP (ref 3.5–5.3)
POTASSIUM SERPL-SCNC: 4.1 MMOL/L — SIGNIFICANT CHANGE UP (ref 3.5–5.3)
RBC # BLD: 3.48 M/UL — LOW (ref 4.6–6.2)
RBC # FLD: 13.6 % — SIGNIFICANT CHANGE UP (ref 11–15.6)
SODIUM SERPL-SCNC: 132 MMOL/L — LOW (ref 135–145)
SPECIMEN SOURCE: SIGNIFICANT CHANGE UP
WBC # BLD: 9 K/UL — SIGNIFICANT CHANGE UP (ref 4.8–10.8)
WBC # FLD AUTO: 9 K/UL — SIGNIFICANT CHANGE UP (ref 4.8–10.8)

## 2017-03-25 PROCEDURE — 99233 SBSQ HOSP IP/OBS HIGH 50: CPT

## 2017-03-25 PROCEDURE — 71010: CPT | Mod: 26

## 2017-03-25 RX ORDER — ALBUMIN HUMAN 25 %
250 VIAL (ML) INTRAVENOUS ONCE
Qty: 0 | Refills: 0 | Status: COMPLETED | OUTPATIENT
Start: 2017-03-25 | End: 2017-03-25

## 2017-03-25 RX ORDER — SODIUM CHLORIDE 9 MG/ML
500 INJECTION, SOLUTION INTRAVENOUS ONCE
Qty: 0 | Refills: 0 | Status: COMPLETED | OUTPATIENT
Start: 2017-03-25 | End: 2017-03-25

## 2017-03-25 RX ORDER — INSULIN LISPRO 100/ML
3 VIAL (ML) SUBCUTANEOUS
Qty: 0 | Refills: 0 | Status: DISCONTINUED | OUTPATIENT
Start: 2017-03-25 | End: 2017-03-28

## 2017-03-25 RX ORDER — CALCIUM GLUCONATE 100 MG/ML
1 VIAL (ML) INTRAVENOUS ONCE
Qty: 0 | Refills: 0 | Status: COMPLETED | OUTPATIENT
Start: 2017-03-25 | End: 2017-03-25

## 2017-03-25 RX ORDER — PYRIDOXINE HCL (VITAMIN B6) 100 MG
100 TABLET ORAL DAILY
Qty: 0 | Refills: 0 | Status: DISCONTINUED | OUTPATIENT
Start: 2017-03-25 | End: 2017-04-01

## 2017-03-25 RX ADMIN — Medication 30 MILLIGRAM(S): at 06:13

## 2017-03-25 RX ADMIN — PYRAZINAMIDE 1500 MILLIGRAM(S): 500 TABLET ORAL at 11:25

## 2017-03-25 RX ADMIN — Medication 30 MILLIGRAM(S): at 13:49

## 2017-03-25 RX ADMIN — Medication 100 MILLIGRAM(S): at 22:31

## 2017-03-25 RX ADMIN — Medication 300 MILLIGRAM(S): at 11:25

## 2017-03-25 RX ADMIN — Medication 30 MILLIGRAM(S): at 23:00

## 2017-03-25 RX ADMIN — Medication 125 MILLILITER(S): at 04:56

## 2017-03-25 RX ADMIN — Medication 125 MILLILITER(S): at 02:20

## 2017-03-25 RX ADMIN — MEROPENEM 200 MILLIGRAM(S): 1 INJECTION INTRAVENOUS at 22:32

## 2017-03-25 RX ADMIN — Medication: at 08:25

## 2017-03-25 RX ADMIN — Medication 200 GRAM(S): at 00:47

## 2017-03-25 RX ADMIN — MEROPENEM 200 MILLIGRAM(S): 1 INJECTION INTRAVENOUS at 14:12

## 2017-03-25 RX ADMIN — ETHAMBUTOL HYDROCHLORIDE 1200 MILLIGRAM(S): 400 TABLET, FILM COATED ORAL at 11:25

## 2017-03-25 RX ADMIN — SODIUM CHLORIDE 2000 MILLILITER(S): 9 INJECTION, SOLUTION INTRAVENOUS at 06:46

## 2017-03-25 RX ADMIN — Medication 750 MILLILITER(S): at 19:32

## 2017-03-25 RX ADMIN — Medication 30 MILLIGRAM(S): at 22:31

## 2017-03-25 RX ADMIN — MEROPENEM 200 MILLIGRAM(S): 1 INJECTION INTRAVENOUS at 06:13

## 2017-03-25 RX ADMIN — ENOXAPARIN SODIUM 40 MILLIGRAM(S): 100 INJECTION SUBCUTANEOUS at 11:26

## 2017-03-25 RX ADMIN — Medication 2: at 16:55

## 2017-03-25 RX ADMIN — Medication 100 MILLIGRAM(S): at 12:26

## 2017-03-25 RX ADMIN — Medication 4: at 12:27

## 2017-03-25 RX ADMIN — INSULIN GLARGINE 10 UNIT(S): 100 INJECTION, SOLUTION SUBCUTANEOUS at 22:31

## 2017-03-25 RX ADMIN — Medication 750 MILLILITER(S): at 09:22

## 2017-03-25 RX ADMIN — SODIUM CHLORIDE 50 MILLILITER(S): 9 INJECTION, SOLUTION INTRAVENOUS at 01:39

## 2017-03-25 RX ADMIN — Medication 30 MILLIGRAM(S): at 14:12

## 2017-03-25 NOTE — PROGRESS NOTE ADULT - ASSESSMENT
PSOT OP VATS AND DECORTICATION  ALL C/S SENT APPROP SENT - CONFIRMED WITH LAB  CONTINUE WITH PRIOR MERREM  CONT ALL 4 MEDS TB PENDING PATH AND 6 WEEK CULTURE

## 2017-03-25 NOTE — PROGRESS NOTE ADULT - ASSESSMENT
41 year old male POD #1 VATS procedure for Empyema, suspected TB getting treatment with Rifampin, Isoniazid, Pyrazinamide, Ethambutol, Meropenem; requiring low dose Levophed and fluid resuscitation for orthostatic episode.

## 2017-03-25 NOTE — PROGRESS NOTE ADULT - ASSESSMENT
41M who originally presented to [facility] for pneumonia and was found to have lung abscesses requiring transfer to [facility] for further management. The patient was continued on intravenous antibiotics for empyema. He was seen by Cardiothoracic Surgery in consultation with recommendation to pursue surgical intervention and possibly decortication. The patient was seen by Infectious Disease in consultation and continued on intravenous antibiotics. CT of the chest noted no evidence of mediastinal or hilar lymphadenopathy or pericardial effusion, noted a loculated right pleural effusion, pleural thickening surrounding the loculated effusion, consolidation of the right lower lobe, cavitation within the consolidated right lower lobe with small fluid pockets within the lung. HIV screen was noted to be negative and the patient was placed in isolation while awaiting further evaluation for possible tuberculosis. 41M who originally presented to [facility] for pneumonia and was found to have lung abscesses requiring transfer to [facility] for further management. The patient was continued on intravenous antibiotics for empyema. He was seen by Cardiothoracic Surgery in consultation with recommendation to pursue surgical intervention and possibly decortication. The patient was seen by Infectious Disease in consultation and continued on intravenous antibiotics. CT of the chest noted no evidence of mediastinal or hilar lymphadenopathy or pericardial effusion, noted a loculated right pleural effusion, pleural thickening surrounding the loculated effusion, consolidation of the right lower lobe, cavitation within the consolidated right lower lobe with small fluid pockets within the lung. HIV screen was noted to be negative and the patient was placed in isolation while awaiting further evaluation for possible tuberculosis.  S/p VATS, Decortication right lung, with clinical improvement. Sputum culture with MSSA sans bacteremia. Intra-operative cultures pending. Remains afebrile without leukocytosis. Being treated for TB empirically.  DMT2 uncontrolled, A1c >12, improving in patient hyperglycemia.  At risk for severe Protein Calorie Malnutrition given low albumin.  Anemia, Normocytic appears stable.  Hypertension - currently normotensive 41M who originally presented to [facility] for pneumonia and was found to have lung abscesses requiring transfer to [facility] for further management. The patient was continued on intravenous antibiotics for empyema. He was seen by Cardiothoracic Surgery in consultation with recommendation to pursue surgical intervention and possibly decortication. The patient was seen by Infectious Disease in consultation and continued on intravenous antibiotics. CT of the chest noted no evidence of mediastinal or hilar lymphadenopathy or pericardial effusion, noted a loculated right pleural effusion, pleural thickening surrounding the loculated effusion, consolidation of the right lower lobe, cavitation within the consolidated right lower lobe with small fluid pockets within the lung. HIV screen was noted to be negative and the patient was placed in isolation while awaiting further evaluation for possible tuberculosis.  S/p VATS, Decortication right lung, with clinical improvement. Sputum culture with MSSA sans bacteremia. Intra-operative cultures pending. Remains afebrile without leukocytosis. Being treated for TB empirically.  DMT2 uncontrolled, A1c >12, improving in patient hyperglycemia.  At risk for severe Protein Calorie Malnutrition given low albumin.  Anemia, Normocytic appears stable.  Hypertension - currently normotensive on Phenylephrine.

## 2017-03-25 NOTE — PROGRESS NOTE ADULT - SUBJECTIVE AND OBJECTIVE BOX
Patient is a 40 yo old  Male who presents with a chief complaint of fever, chills, cough (22 Mar 2017 20:10) POD#1 VATS procedure for Empyema secondary to suspected TB    Interval/Overnight Events: Patient tolerated the procedure well. While in PACU, requiring Levophed for BP support, unable to wean and brought to the CTICU. Overnight, patient is hemodynamically stable, intermittently requiring low dose Levophed, fluid resuscitation. Chest tubes have minimal output.     VITAL SIGNS:  T(C): 36.7, Max: 37 (03-24 @ 12:00)  HR: 81 (72 - 94)  BP: 105/68 (64/38 - 129/87)  RR: 19 (15 - 19)  SpO2: 100% (97% - 100%)      RESPIRATORY:  [] FiO2:		[] Heliox	[] BiPAP:   [x] NC			[] HFNC:    Liters, FiO2:  [] End-Tidal CO2:  [] Mechanical Ventilation:       CARDIOVASCULAR  Cardiovascular Medications:  phenylephrine    Infusion 0.5MICROgram(s)/kG/Min IV Continuous <Continuous>      Cardiac Rhythm:	[x] NSR		[] Other:  Comments:    HEMATOLOGIC/ONCOLOGIC:                        10.1   9.0   )-----------( 389      ( 25 Mar 2017 04:56 )             30.3     Hematologic/Oncologic Medications:  enoxaparin Injectable 40milliGRAM(s) SubCutaneous every 24 hours    [x] DVT Prophylaxis: Lovenox  Comments:    INFECTIOUS DISEASE:  Antimicrobials/Immunologic Medications:  meropenem IVPB 1000milliGRAM(s) IV Intermittent every 8 hours  isoniazid 300milliGRAM(s) Oral daily  rifampin 600milliGRAM(s) Oral daily  ethambutol 1200milliGRAM(s) Oral daily  pyrazinamide 1500milliGRAM(s) Oral daily    Cultures:    FLUIDS/ELECTROLYTES/NUTRITION:  I&O's Summary  I & Os for 24h ending 24 Mar 2017 07:00  =============================================  IN: 1860 ml / OUT: 0 ml / NET: 1860 ml    I & Os for current day (as of 25 Mar 2017 06:28)  =============================================  IN: 1706.7 ml / OUT: 835 ml / NET: 871.7 ml    Daily Weight in k (25 Mar 2017 01:00)  25 Mar 2017 04:56    132    |  97     |  8.0    ----------------------------<  199    4.1     |  27.0   |  0.31     Ca    8.0        25 Mar 2017 04:56        Diet:	[x] Regular	[] Soft		[] Clears	[] NPO  .	[] Other:  .	[] NGT		[] NDT		[] GT		[] GJT    Gastrointestinal Medications:  docusate sodium 100milliGRAM(s) Oral three times a day  dextrose 5%. 1000milliLiter(s) IV Continuous <Continuous>  lactated ringers. 1000milliLiter(s) IV Continuous <Continuous>  lactated ringers Bolus 500milliLiter(s) IV Bolus once  lactated ringers Bolus 500milliLiter(s) IV Bolus once    Comments:    NEUROLOGY:    Neurologic Medications:  ketorolac   Injectable 30milliGRAM(s) IV Push every 8 hours  oxyCODONE  5 mG/acetaminophen 325 mG 1Tablet(s) Oral every 6 hours PRN  oxyCODONE  5 mG/acetaminophen 325 mG 2Tablet(s) Oral every 6 hours PRN    Comments:    OTHER MEDICATIONS:  Endocrine/Metabolic Medications:  insulin glargine Injectable (LANTUS) 10Unit(s) SubCutaneous at bedtime  insulin lispro (HumaLOG) corrective regimen sliding scale  SubCutaneous three times a day before meals  dextrose Gel 1Dose(s) Oral once PRN  dextrose 50% Injectable 12.5Gram(s) IV Push once  dextrose 50% Injectable 25Gram(s) IV Push once  dextrose 50% Injectable 25Gram(s) IV Push once  glucagon  Injectable 1milliGRAM(s) IntraMuscular once PRN    Genitourinary Medications:    Topical/Other Medications:      PATIENT CARE ACCESS DEVICES:  [x] Peripheral IV  [] Central Venous Line	[] R	[] L	[] IJ	[] Fem	[] SC			[] Placed:  [] Arterial Line		[] R	[] L	[] PT	[] DP	[] Fem	[] Rad	[] Ax	[] Placed:  [] PICC:				[] Broviac		[] Mediport  [] Urinary Catheter, Date Placed:  [] Necessity of urinary, arterial, and venous catheters discussed    PHYSICAL EXAM:  Respiratory:		[x] Normal  Breath Sounds:		[] Normal  .	Rhonchi	[] Right		[] Left  .	Wheezing	[] Right		[] Left  .	Diminished	[] Right		[] Left  .	Crackles	[] Right		[] Left  Effort:		[x] Even unlabored	[] Nasal Flaring		[] Grunting  .		[] Stridor		[] Retractions		[] Ventilator assisted  Comments:    Cardiovascular:		[x] Normal  Murmur:		[x] None		[] Present:  Capillary Refill		[x] Brisk, less than 2 seconds	[] Prolonged:  Pulses:			[x] Equal and strong		[] Other:  Comments:    Abdominal:	[x] Normal  [x] Soft		[] Distended	[] Tender	[] Taut		[] Rigid		[] BS Absent  Comments:    Skin:		[x] Normal  Edema:		[x] None		[] Generalized	[] 1+	[] 2+	[] 3+	[] 4+  Rash:		[x] None		[] Present:  Comments:    Neurologic:	[x] Normal  		[] Alert		[] Sedated	[] No acute change from baseline  Comments:      IMAGING STUDIES:    Patient and Parent/Guardian updated as to the progress/plan of care:	[x] Yes	[] No    [x] The patient is improving but requires continued monitoring and adjustment of therapy

## 2017-03-25 NOTE — PROGRESS NOTE ADULT - SUBJECTIVE AND OBJECTIVE BOX
HOSPITALIST PROGRESS NOTE    LYNN MELISSA  [id number]  41yMale    Patient is a 41y old  Male who presents with a chief complaint of fever, chills, cough (22 Mar 2017 20:10)      SUBJECTIVE: Chart reviewed since last visit. Patient seen and examined at bedside.      OBJECTIVE:  Vital Signs Last 24 Hrs  T(C): 36.7, Max: 36.9 (03-25 @ 00:10)  T(F): 98.1, Max: 98.4 (03-25 @ 00:10)  HR: 77 (71 - 94)  BP: 98/69 (74/46 - 889/546)  BP(mean): 80 (61 - 111)  RR: 19 (11 - 27)  SpO2: 99% (95% - 100%)    PHYSICAL EXAMINATION  General: NAD[]   nontoxic[]   ill-appearing[]  Obese[]  HEENT: AT/NC[]  PERRLA[]  EOMI[]   Moist oral mucosa[]  Pharyngeal exudates[]  NECK: Supple[]  JVD[] Carotid bruit[]  CVS: RRR[]  Irregular[]  S1+S2[]   Murmur[]  RESP: Fair air entry bilaterally[]   Clear sounds[]   poor effort []  wheeze[]   Crackles[]  GI: Soft[]  Nondistended[]   Nontender[]   Bowel Sounds[]  Mass[]   HSM[]   Ascites[]  : suprapubic tenderness[]   CVA Tenderness[]   John[]  MS: FROM[]   Edema[]  CNS: AAOx3[]    Motor strength /5     DTR +    Sensory    Coordination    Gait  INTEG: Skin is Warm[]  dry[] Lesion[] Decubitus[]  PSYCH: Mood, Affect    MONITOR:  CAPILLARY BLOOD GLUCOSE  172 (25 Mar 2017 17:00)  190 (25 Mar 2017 15:30)  223 (25 Mar 2017 12:00)  167 (25 Mar 2017 07:00)  188 (24 Mar 2017 19:24)        I&O's Summary  I & Os for 24h ending 25 Mar 2017 07:00  =============================================  IN: 2206.7 ml / OUT: 835 ml / NET: 1371.7 ml    I & Os for current day (as of 25 Mar 2017 19:19)  =============================================  IN: 1765 ml / OUT: 935 ml / NET: 830 ml                          10.1   9.0   )-----------( 389      ( 25 Mar 2017 04:56 )             30.3       25 Mar 2017 04:56    132    |  97     |  8.0    ----------------------------<  199    4.1     |  27.0   |  0.31     Ca    8.0        25 Mar 2017 04:56              Culture:    TTE:    RADIOLOGY        MEDICATIONS  (STANDING):  enoxaparin Injectable 40milliGRAM(s) SubCutaneous every 24 hours  docusate sodium 100milliGRAM(s) Oral three times a day  insulin glargine Injectable (LANTUS) 10Unit(s) SubCutaneous at bedtime  insulin lispro (HumaLOG) corrective regimen sliding scale  SubCutaneous three times a day before meals  dextrose 5%. 1000milliLiter(s) IV Continuous <Continuous>  dextrose 50% Injectable 12.5Gram(s) IV Push once  dextrose 50% Injectable 25Gram(s) IV Push once  dextrose 50% Injectable 25Gram(s) IV Push once  lactated ringers. 1000milliLiter(s) IV Continuous <Continuous>  meropenem IVPB 1000milliGRAM(s) IV Intermittent every 8 hours  isoniazid 300milliGRAM(s) Oral daily  rifampin 600milliGRAM(s) Oral daily  ethambutol 1200milliGRAM(s) Oral daily  pyrazinamide 1500milliGRAM(s) Oral daily  phenylephrine    Infusion 0.5MICROgram(s)/kG/Min IV Continuous <Continuous>  ketorolac   Injectable 30milliGRAM(s) IV Push every 8 hours  lactated ringers Bolus 500milliLiter(s) IV Bolus once  pyridoxine 100milliGRAM(s) Oral daily  albumin human  5% IVPB 250milliLiter(s) IV Intermittent once      MEDICATIONS  (PRN):  dextrose Gel 1Dose(s) Oral once PRN Blood Glucose LESS THAN 70 milliGRAM(s)/deciLiter  glucagon  Injectable 1milliGRAM(s) IntraMuscular once PRN Glucose <70 milliGRAM(s)/deciLiter  oxyCODONE  5 mG/acetaminophen 325 mG 1Tablet(s) Oral every 6 hours PRN Moderate Pain (4 - 6)  oxyCODONE  5 mG/acetaminophen 325 mG 2Tablet(s) Oral every 6 hours PRN Severe Pain (7 - 10) HOSPITALIST PROGRESS NOTE    LYNN MELISSA  [id number]  41yMale    Patient is a 41y old  Male who presents with a chief complaint of fever, chills, cough (22 Mar 2017 20:10)      SUBJECTIVE: Chart reviewed since last visit. Patient seen and examined at bedside for empyema. S/p VATS 03/24/17. Feels better. chest pain at tube insertion site. Decreased cough, denies dyspnea. Denies fever, chills.      OBJECTIVE:  Vital Signs Last 24 Hrs  T(C): 36.7, Max: 36.9 (03-25 @ 00:10)  T(F): 98.1, Max: 98.4 (03-25 @ 00:10)  HR: 77 (71 - 94)  BP: 98/69    BP(mean): 80 (61 - 111)  RR: 19 (11 - 27)  SpO2: 99% (95% - 100%)    PHYSICAL EXAMINATION  General: NAD[+]   nontoxic[+]   ill-appearing[]  Obese[]  HEENT: AT/NC[+]  PERRLA[+]  EOMI[]   Moist oral mucosa[]  Pharyngeal exudates[]  NECK: Supple[+]  JVD[] Carotid bruit[]  CVS: RRR[+]  Irregular[]  S1+S2[]   Murmur[]  RESP: Fair air entry bilaterally[+]except Right lower lung with absent sounds   Clear sounds[]   poor effort []  wheeze[]   Crackles[+]RLL. Chest tube Right lung  GI: Soft[]  Nondistended[]   Nontender[]   Bowel Sounds[]  Mass[]   HSM[]   Ascites[]  : suprapubic tenderness[-]   CVA Tenderness[]   John[]  MS: FROM[+]   Edema[]  CNS: AAOx3[+]     INTEG: Skin is Warm[+]  dry[] Lesion[] Decubitus[]  PSYCH: Anxious Mood, Affect    MONITOR:  CAPILLARY BLOOD GLUCOSE  172 (25 Mar 2017 17:00)  190 (25 Mar 2017 15:30)  223 (25 Mar 2017 12:00)  167 (25 Mar 2017 07:00)  188 (24 Mar 2017 19:24)        I&O's Summary  I & Os for 24h ending 25 Mar 2017 07:00  =============================================  IN: 2206.7 ml / OUT: 835 ml / NET: 1371.7 ml    I & Os for current day (as of 25 Mar 2017 19:19)  =============================================  IN: 1765 ml / OUT: 935 ml / NET: 830 ml                          10.1   9.0   )-----------( 389      ( 25 Mar 2017 04:56 )             30.3       25 Mar 2017 04:56    132    |  97     |  8.0    ----------------------------<  199    4.1     |  27.0   |  0.31     Ca    8.0        25 Mar 2017 04:56              Culture: reviewed. MSSA on sputum, negative bloods    RADIOLOGY: reviewed CTChest. Empyema. Chest X-Ray reviewed.        MEDICATIONS  (STANDING):  enoxaparin Injectable 40milliGRAM(s) SubCutaneous every 24 hours  docusate sodium 100milliGRAM(s) Oral three times a day  insulin glargine Injectable (LANTUS) 10Unit(s) SubCutaneous at bedtime  insulin lispro (HumaLOG) corrective regimen sliding scale  SubCutaneous three times a day before meals  dextrose 5%. 1000milliLiter(s) IV Continuous <Continuous>  dextrose 50% Injectable 12.5Gram(s) IV Push once  dextrose 50% Injectable 25Gram(s) IV Push once  dextrose 50% Injectable 25Gram(s) IV Push once  lactated ringers. 1000milliLiter(s) IV Continuous <Continuous>  meropenem IVPB 1000milliGRAM(s) IV Intermittent every 8 hours  isoniazid 300milliGRAM(s) Oral daily  rifampin 600milliGRAM(s) Oral daily  ethambutol 1200milliGRAM(s) Oral daily  pyrazinamide 1500milliGRAM(s) Oral daily  phenylephrine    Infusion 0.5MICROgram(s)/kG/Min IV Continuous <Continuous>  ketorolac   Injectable 30milliGRAM(s) IV Push every 8 hours  lactated ringers Bolus 500milliLiter(s) IV Bolus once  pyridoxine 100milliGRAM(s) Oral daily  albumin human  5% IVPB 250milliLiter(s) IV Intermittent once      MEDICATIONS  (PRN):  dextrose Gel 1Dose(s) Oral once PRN Blood Glucose LESS THAN 70 milliGRAM(s)/deciLiter  glucagon  Injectable 1milliGRAM(s) IntraMuscular once PRN Glucose <70 milliGRAM(s)/deciLiter  oxyCODONE  5 mG/acetaminophen 325 mG 1Tablet(s) Oral every 6 hours PRN Moderate Pain (4 - 6)  oxyCODONE  5 mG/acetaminophen 325 mG 2Tablet(s) Oral every 6 hours PRN Severe Pain (7 - 10)

## 2017-03-25 NOTE — PROGRESS NOTE ADULT - SUBJECTIVE AND OBJECTIVE BOX
[facility]FECTI[facility] DISEAS[facility]IC[facility] OF [place] SARAH WHITTINGTON MD FACP   LITTLE RICHTER MD  Diplomates American Board of Internal Medicine and Infectious Diseases      LYNN MELISSA  MRN-[id number]  41y    INTERVAL HPI/OVERNIGHT EVENTS:  POST OP ICU  ALL C.S NEG SENT FOR C/S AND AFB - TISSUE AND FLUID FOR AFB  QUANT TB POS PECONIC    Vital Signs Last 24 Hrs  T(C): 36.7, Max: 37 (03-24 @ 12:00)  T(F): 98.1, Max: 98.6 (03-24 @ 12:00)  HR: 90 (72 - 94)  BP: 137/92 (64/38 - 140/91)  BP(mean): 96 (69 - 111)  RR: 13 (11 - 25)  SpO2: 97% (96% - 100%)    ANTIBIOTICS  meropenem IVPB 1000milliGRAM(s) IV Intermittent every 8 hours  isoniazid 300milliGRAM(s) Oral daily  rifampin 600milliGRAM(s) Oral daily  ethambutol 1200milliGRAM(s) Oral daily  pyrazinamide 1500milliGRAM(s) Oral daily      Allergies    No Known Allergies    Intolerances        REVIEW OF SYSTEMS:NEG    PHYSICAL EXAM:  Vital Signs Last 24 Hrs  T(C): 36.7, Max: 37 (03-24 @ 12:00)  T(F): 98.1, Max: 98.6 (03-24 @ 12:00)  HR: 90 (72 - 94)  BP: 137/92 (64/38 - 140/91)  BP(mean): 96 (69 - 111)  RR: 13 (11 - 25)  SpO2: 97% (96% - 100%)      GEN: NAD, pleasant  HEENT: normocephalic and atraumatic. EOMI. NANCY. Moist mucosa. Clear Posterior pharynx.  NECK: Supple. No carotid bruits.  No lymphadenopathy or thyromegaly.  LUNGS: Clear to auscultation.  HEART: Regular rate and rhythm without murmur.  ABDOMEN: Soft, nontender, and nondistended.  Positive bowel sounds.  No hepatosplenomegaly was noted.  EXTREMITIES: Without any cyanosis, clubbing, rash, lesions or edema.  NEUROLOGIC: Cranial nerves II through XII are grossly intact.  MUSCULOSKELETAL:  SKIN: No ulceration or induration present    LABS:                        10.1   9.0   )-----------( 389      ( 25 Mar 2017 04:56 )             30.3       25 Mar 2017 04:56    132    |  97     |  8.0    ----------------------------<  199    4.1     |  27.0   |  0.31     Ca    8.0        25 Mar 2017 04:56          03-25 @ 04:56  hct 30.3 % hgb 10.1 g/dL    03-25 @ 04:56  plat 389 K/uL wbc 9.0 K/uL    03-23 @ 05:19  hct 33.4 % hgb 11.1 g/dL    03-23 @ 05:19  plat 473 K/uL wbc 8.2 K/uL    03-22 @ 22:09  hct 35.9 % hgb 12.1 g/dL    03-22 @ 22:09  plat 503 K/uL wbc 8.8 K/uL      03-25-17  creat 0.31 mg/dL gfr 193 mL/min/1.73M2 bun 8.0 mg/dL k 4.1 mmol/L  03-23-17  creat 0.47 mg/dL gfr 162 mL/min/1.73M2 bun 9.0 mg/dL k 4.0 mmol/L  03-22-17  creat 0.48 mg/dL gfr 161 mL/min/1.73M2 bun 9.0 mg/dL k 3.8 mmol/L      MICROBIOLOGY:  Culture Results:   Rare Staphylococcus aureus  Moderate Candida species  Numerous Routine respiratory april present (03-23 @ 11:12)  Culture Results:   No growth at 48 hours (03-23 @ 01:51)  Culture Results:   No growth at 48 hours (03-23 @ 01:50)  Culture Results:   No growth at 48 hours (03-23 @ 01:50)  Culture Results:   No growth (03-23 @ 00:43)        RADIOLOGY & ADDITIONAL STUDIES:          RALEIGH WHITTINGTON MD FACP

## 2017-03-26 DIAGNOSIS — J86.9 PYOTHORAX WITHOUT FISTULA: ICD-10-CM

## 2017-03-26 DIAGNOSIS — A49.01 METHICILLIN SUSCEPTIBLE STAPHYLOCOCCUS AUREUS INFECTION, UNSPECIFIED SITE: ICD-10-CM

## 2017-03-26 LAB
ANION GAP SERPL CALC-SCNC: 9 MMOL/L — SIGNIFICANT CHANGE UP (ref 5–17)
BUN SERPL-MCNC: 9 MG/DL — SIGNIFICANT CHANGE UP (ref 8–20)
CALCIUM SERPL-MCNC: 7.7 MG/DL — LOW (ref 8.6–10.2)
CHLORIDE SERPL-SCNC: 94 MMOL/L — LOW (ref 98–107)
CO2 SERPL-SCNC: 28 MMOL/L — SIGNIFICANT CHANGE UP (ref 22–29)
CREAT SERPL-MCNC: 0.39 MG/DL — LOW (ref 0.5–1.3)
GLUCOSE SERPL-MCNC: 113 MG/DL — SIGNIFICANT CHANGE UP (ref 70–115)
HCT VFR BLD CALC: 27.1 % — LOW (ref 42–52)
HGB BLD-MCNC: 9.2 G/DL — LOW (ref 14–18)
MAGNESIUM SERPL-MCNC: 1.9 MG/DL — SIGNIFICANT CHANGE UP (ref 1.8–2.5)
MCHC RBC-ENTMCNC: 28.8 PG — SIGNIFICANT CHANGE UP (ref 27–31)
MCHC RBC-ENTMCNC: 33.9 G/DL — SIGNIFICANT CHANGE UP (ref 32–36)
MCV RBC AUTO: 84.7 FL — SIGNIFICANT CHANGE UP (ref 80–94)
NIGHT BLUE STAIN TISS: SIGNIFICANT CHANGE UP
NIGHT BLUE STAIN TISS: SIGNIFICANT CHANGE UP
PLATELET # BLD AUTO: 382 K/UL — SIGNIFICANT CHANGE UP (ref 150–400)
POTASSIUM SERPL-MCNC: 3.9 MMOL/L — SIGNIFICANT CHANGE UP (ref 3.5–5.3)
POTASSIUM SERPL-SCNC: 3.9 MMOL/L — SIGNIFICANT CHANGE UP (ref 3.5–5.3)
PREALB SERPL-MCNC: 9 MG/DL — LOW (ref 18–38)
RBC # BLD: 3.2 M/UL — LOW (ref 4.6–6.2)
RBC # FLD: 13.1 % — SIGNIFICANT CHANGE UP (ref 11–15.6)
SODIUM SERPL-SCNC: 131 MMOL/L — LOW (ref 135–145)
SPECIMEN SOURCE: SIGNIFICANT CHANGE UP
SPECIMEN SOURCE: SIGNIFICANT CHANGE UP
WBC # BLD: 5 K/UL — SIGNIFICANT CHANGE UP (ref 4.8–10.8)
WBC # FLD AUTO: 5 K/UL — SIGNIFICANT CHANGE UP (ref 4.8–10.8)

## 2017-03-26 PROCEDURE — 71010: CPT | Mod: 26

## 2017-03-26 PROCEDURE — 99233 SBSQ HOSP IP/OBS HIGH 50: CPT

## 2017-03-26 RX ORDER — MIDODRINE HYDROCHLORIDE 2.5 MG/1
5 TABLET ORAL EVERY 8 HOURS
Qty: 0 | Refills: 0 | Status: DISCONTINUED | OUTPATIENT
Start: 2017-03-26 | End: 2017-03-26

## 2017-03-26 RX ORDER — SODIUM CHLORIDE 9 MG/ML
500 INJECTION INTRAMUSCULAR; INTRAVENOUS; SUBCUTANEOUS ONCE
Qty: 0 | Refills: 0 | Status: COMPLETED | OUTPATIENT
Start: 2017-03-26 | End: 2017-03-26

## 2017-03-26 RX ORDER — INSULIN LISPRO 100/ML
VIAL (ML) SUBCUTANEOUS
Qty: 0 | Refills: 0 | Status: DISCONTINUED | OUTPATIENT
Start: 2017-03-26 | End: 2017-04-01

## 2017-03-26 RX ADMIN — Medication 3 UNIT(S): at 11:56

## 2017-03-26 RX ADMIN — Medication 100 MILLIGRAM(S): at 11:57

## 2017-03-26 RX ADMIN — Medication 30 MILLIGRAM(S): at 05:40

## 2017-03-26 RX ADMIN — INSULIN GLARGINE 10 UNIT(S): 100 INJECTION, SOLUTION SUBCUTANEOUS at 21:46

## 2017-03-26 RX ADMIN — ETHAMBUTOL HYDROCHLORIDE 1200 MILLIGRAM(S): 400 TABLET, FILM COATED ORAL at 11:58

## 2017-03-26 RX ADMIN — MEROPENEM 200 MILLIGRAM(S): 1 INJECTION INTRAVENOUS at 21:37

## 2017-03-26 RX ADMIN — Medication 30 MILLIGRAM(S): at 14:20

## 2017-03-26 RX ADMIN — Medication 30 MILLIGRAM(S): at 05:06

## 2017-03-26 RX ADMIN — Medication 300 MILLIGRAM(S): at 11:56

## 2017-03-26 RX ADMIN — MIDODRINE HYDROCHLORIDE 5 MILLIGRAM(S): 2.5 TABLET ORAL at 14:08

## 2017-03-26 RX ADMIN — ENOXAPARIN SODIUM 40 MILLIGRAM(S): 100 INJECTION SUBCUTANEOUS at 11:58

## 2017-03-26 RX ADMIN — Medication 30 MILLIGRAM(S): at 14:08

## 2017-03-26 RX ADMIN — Medication 100 MILLIGRAM(S): at 14:08

## 2017-03-26 RX ADMIN — MEROPENEM 200 MILLIGRAM(S): 1 INJECTION INTRAVENOUS at 14:08

## 2017-03-26 RX ADMIN — SODIUM CHLORIDE 2000 MILLILITER(S): 9 INJECTION INTRAMUSCULAR; INTRAVENOUS; SUBCUTANEOUS at 16:00

## 2017-03-26 RX ADMIN — PYRAZINAMIDE 1500 MILLIGRAM(S): 500 TABLET ORAL at 11:57

## 2017-03-26 RX ADMIN — MEROPENEM 200 MILLIGRAM(S): 1 INJECTION INTRAVENOUS at 05:06

## 2017-03-26 RX ADMIN — MIDODRINE HYDROCHLORIDE 5 MILLIGRAM(S): 2.5 TABLET ORAL at 08:30

## 2017-03-26 RX ADMIN — Medication 3 UNIT(S): at 16:36

## 2017-03-26 RX ADMIN — Medication 3 UNIT(S): at 08:31

## 2017-03-26 RX ADMIN — Medication 100 MILLIGRAM(S): at 05:06

## 2017-03-26 RX ADMIN — Medication 4: at 21:46

## 2017-03-26 RX ADMIN — Medication 3 UNIT(S): at 21:46

## 2017-03-26 NOTE — PROGRESS NOTE ADULT - ASSESSMENT
41 year old male s/p VATS procedure for Empyema, suspected TB getting treatment with Rifampin, Isoniazid, Pyrazinamide, Ethambutol, Meropenem; requiring low dose phenylephrine and fluid resuscitation for orthostatic episode.

## 2017-03-26 NOTE — PROGRESS NOTE ADULT - ASSESSMENT
POST OP  C/S POS MSSA SPUTA AND S PENDING S AUREUS PLEUR  PROB ALL COMPLICATED MSSA PNEUMONIA  STILL WANT TB R/O ON PATH AND THEN CAN PROB D/C TB MEDS

## 2017-03-26 NOTE — PROGRESS NOTE ADULT - SUBJECTIVE AND OBJECTIVE BOX
41y Male s/p VATS (video-assisted thoracoscopic surgery)      Subjective:  Patient resting comfortably in bed without complaints.    T(C): 36.7, Max: 36.8 (03-25 @ 19:00)  HR: 75 (71 - 94)  BP: 101/63 (79/44 - 889/546)  RR: 16 (11 - 31)  SpO2: 96% (95% - 100%)        25 Mar 2017 04:56    132    |  97     |  8.0    ----------------------------<  199    4.1     |  27.0   |  0.31     Ca    8.0        25 Mar 2017 04:56                                 10.1   9.0   )-----------( 389      ( 25 Mar 2017 04:56 )             30.3                     CAPILLARY BLOOD GLUCOSE  167 (25 Mar 2017 22:00)  172 (25 Mar 2017 17:00)  190 (25 Mar 2017 15:30)  223 (25 Mar 2017 12:00)  167 (25 Mar 2017 07:00)           CXR: AP radiograph of the chest demonstrates unchanged RIGHT thoracostomy   tubes with small RIGHT pleural effusion and underlying atelectasis. The   RIGHT lateral subcutaneous emphysema again noted. The cardiac silhouette   is normal in size. Osseous structures are intact.    I&O's Detail  I & Os for 24h ending 25 Mar 2017 07:00  =============================================  IN:    Albumin 5%  - 250 mL: 500 ml    Lactated Ringers IV Bolus: 500 ml    Oral Fluid: 490 ml    lactated ringers.: 400 ml    phenylephrine   Infusion: 116.7 ml    IV PiggyBack: 100 ml    Solution: 100 ml    Total IN: 2206.7 ml  ---------------------------------------------  OUT:    Voided: 500 ml    Chest Tube: 260 ml    Chest Tube: 75 ml    Total OUT: 835 ml  ---------------------------------------------  Total NET: 1371.7 ml    I & Os for current day (as of 26 Mar 2017 01:33)  =============================================  IN:    lactated ringers.: 900 ml    Oral Fluid: 780 ml    Albumin 5%  - 250 mL: 250 ml    IV PiggyBack: 100 ml    phenylephrine   Infusion: 55.9 ml    Total IN: 2085.9 ml  ---------------------------------------------  OUT:    Voided: 1050 ml    Chest Tube: 85 ml    Chest Tube: 70 ml    Total OUT: 1205 ml  ---------------------------------------------  Total NET: 880.9 ml      MEDICATIONS  (STANDING):  enoxaparin Injectable 40milliGRAM(s) SubCutaneous every 24 hours  docusate sodium 100milliGRAM(s) Oral three times a day  insulin glargine Injectable (LANTUS) 10Unit(s) SubCutaneous at bedtime  insulin lispro (HumaLOG) corrective regimen sliding scale  SubCutaneous three times a day before meals  lactated ringers. 1000milliLiter(s) IV Continuous @50  meropenem IVPB 1000milliGRAM(s) IV Intermittent every 8 hours  isoniazid 300milliGRAM(s) Oral daily  rifampin 600milliGRAM(s) Oral daily  ethambutol 1200milliGRAM(s) Oral daily  pyrazinamide 1500milliGRAM(s) Oral daily  phenylephrine    Infusion 0.5MICROgram(s)/kG/Min IV Continuous <Continuous>  ketorolac   Injectable 30milliGRAM(s) IV Push every 8 hours  lactated ringers Bolus 500milliLiter(s) IV Bolus once  pyridoxine 100milliGRAM(s) Oral daily  insulin lispro Injectable (HumaLOG) 3Unit(s) SubCutaneous Before meals and at bedtime    MEDICATIONS  (PRN):  oxyCODONE  5 mG/acetaminophen 325 mG 1Tablet(s) Oral every 6 hours PRN Moderate Pain (4 - 6)      Physical Exam  Neuro: A+O x conversation no focal abrnormalities  Pulm: CTA, equal bilaterally  CV: RRR, +S1S2  Abd: soft, NT, ND  Ext: REAVES x 4, no edema  Inc: Dressing clean and dry

## 2017-03-26 NOTE — DIETITIAN INITIAL EVALUATION ADULT. - NS AS NUTRI INTERV MEDICAL AND FOOD SUPPLEMENTS
Commercial beverage/8oz Ensure Enlive TID po Commercial beverage/Change Ensure Clear to 8oz Glucerna TID Po

## 2017-03-26 NOTE — DIETITIAN INITIAL EVALUATION ADULT. - SIGNS/SYMPTOMS
as evidenced by increased estimated nutrient needs to optimize nutritional status. as evidenced by HgbA1c 12.3%

## 2017-03-26 NOTE — PROGRESS NOTE ADULT - PROBLEM SELECTOR PLAN 3
Wean phenylephrine gtt. Fluid resuscitation with LR@50cc.  Patient returned to bed and had improvement in BP and decrease in pressor requirement.  Likely intravascularly dry.  Will question starting proamatine in AM.

## 2017-03-26 NOTE — PROGRESS NOTE ADULT - SUBJECTIVE AND OBJECTIVE BOX
HOSPITALIST PROGRESS NOTE    LYNN MELISSA  [id number]  41yMale    Patient is a 41y old  Male who presents with a chief complaint of fever, chills, cough (22 Mar 2017 20:10)      SUBJECTIVE: Chart reviewed since last visit. Patient seen and examined at bedside for empyema.   Denies dyspnea, feels better.  Minimal cough. Dneies fever, chills  Chest pain at tube insertion site        OBJECTIVE:  Vital Signs Last 24 Hrs  T(C): 36.8, Max: 36.8 (03-25 @ 19:00)  T(F): 98.2, Max: 98.3 (03-25 @ 19:00)  HR: 87 (71 - 87)  BP: 114/74 (79/47 - 145/91)  BP(mean): 89 (58 - 113)  RR: 23 (14 - 31)  SpO2: 96% (95% - 99%)    PHYSICAL EXAMINATION  General: NAD[+]   nontoxic[+]   ill-appearing[]  Obese[]  HEENT: AT/NC[+]  PERRLA[+]  EOMI[]   Moist oral mucosa[]  Pharyngeal exudates[]  NECK: Supple[+]  JVD[] Carotid bruit[]  CVS: RRR[+]  Irregular[]  S1+S2[]   Murmur[]  RESP: Fair air entry bilaterally[+]except Right lower lung with absent sounds   Clear sounds[]   poor effort []  wheeze[]   Crackles[+]RLL. Chest tube Right lung  GI: Soft[]  Nondistended[]   Nontender[]   Bowel Sounds[]  Mass[]   HSM[]   Ascites[]  : suprapubic tenderness[-]   CVA Tenderness[]   John[]  MS: FROM[+]   Edema[]  CNS: AAOx3[+]     INTEG: Skin is Warm[+]  dry[] Lesion[] Decubitus[]  PSYCH: Anxious Mood, Affect    MONITOR:  CAPILLARY BLOOD GLUCOSE  109 (26 Mar 2017 12:00)  83 (26 Mar 2017 08:00)  167 (25 Mar 2017 22:00)  172 (25 Mar 2017 17:00)  190 (25 Mar 2017 15:30)        I&O's Summary  I & Os for 24h ending 26 Mar 2017 07:00  =============================================  IN: 2385.9 ml / OUT: 1510 ml / NET: 875.9 ml    I & Os for current day (as of 26 Mar 2017 14:31)  =============================================  IN: 1080 ml / OUT: 510 ml / NET: 570 ml                          9.2    5.0   )-----------( 382      ( 26 Mar 2017 03:45 )             27.1       26 Mar 2017 03:45    131    |  94     |  9.0    ----------------------------<  113    3.9     |  28.0   |  0.39     Ca    7.7        26 Mar 2017 03:45  Mg     1.9       26 Mar 2017 03:45      Culture - Body Fluid with Gram Stain (03.24.17 @ 22:50)    Gram Stain:   Few White blood cells  No organisms seen    Specimen Source: .Body Fluid right pleural effusion    Culture Results:   Rare Staphylococcus aureus Identification and susceptibility to follow.  Culture in progress    Culture - Acid Fast - Tissue w/Smear (03.25.17 @ 22:46)    Specimen Source: .Tissue Products of decortication    Acid Fast Bacilli Smear:   No acid fast bacilli seen by fluorochrome stain            MEDICATIONS  (STANDING):  enoxaparin Injectable 40milliGRAM(s) SubCutaneous every 24 hours  docusate sodium 100milliGRAM(s) Oral three times a day  insulin glargine Injectable (LANTUS) 10Unit(s) SubCutaneous at bedtime  lactated ringers. 1000milliLiter(s) IV Continuous <Continuous>  meropenem IVPB 1000milliGRAM(s) IV Intermittent every 8 hours  phenylephrine    Infusion 0.5MICROgram(s)/kG/Min IV Continuous <Continuous>  lactated ringers Bolus 500milliLiter(s) IV Bolus once  pyridoxine 100milliGRAM(s) Oral daily  insulin lispro Injectable (HumaLOG) 3Unit(s) SubCutaneous Before meals and at bedtime  insulin lispro (HumaLOG) corrective regimen sliding scale  SubCutaneous Before meals and at bedtime  midodrine 5milliGRAM(s) Oral every 8 hours      MEDICATIONS  (PRN):  oxyCODONE  5 mG/acetaminophen 325 mG 1Tablet(s) Oral every 6 hours PRN Moderate Pain (4 - 6)

## 2017-03-26 NOTE — PROGRESS NOTE ADULT - SUBJECTIVE AND OBJECTIVE BOX
[facility] INFECTIOUS DISEASES AND INTERNAL MEDICINE OF [place] SARAH WHITTINGTON MD FACP   LITTLE RICHTER MD  Diplomates American Board of Internal Medicine and Infectious Diseases      LYNN MELISSA  [id number]  41y    INTERVAL HPI/OVERNIGHT EVENTS:  POST OP ICU  ALL C.S NEG SENT FOR C/S AND AFB - TISSUE AND FLUID FOR AFB  QUANT TB POS PECONIC  POS C/S SPUTA HERE MSSA  PLEURAL FLUID S AUREUS    Vital Signs Last 24 Hrs  T(C): 36.7, Max: 37 (03-24 @ 12:00)  T(F): 98.1, Max: 98.6 (03-24 @ 12:00)  HR: 90 (72 - 94)  BP: 137/92 (64/38 - 140/91)  BP(mean): 96 (69 - 111)  RR: 13 (11 - 25)  SpO2: 97% (96% - 100%)    ANTIBIOTICS  meropenem IVPB 1000milliGRAM(s) IV Intermittent every 8 hours  isoniazid 300milliGRAM(s) Oral daily  rifampin 600milliGRAM(s) Oral daily  ethambutol 1200milliGRAM(s) Oral daily  pyrazinamide 1500milliGRAM(s) Oral daily      Allergies    No Known Allergies    Intolerances        REVIEW OF SYSTEMS:NEG    PHYSICAL EXAM:  Vital Signs Last 24 Hrs  T(C): 36.7, Max: 37 (03-24 @ 12:00)  T(F): 98.1, Max: 98.6 (03-24 @ 12:00)  HR: 90 (72 - 94)  BP: 137/92 (64/38 - 140/91)  BP(mean): 96 (69 - 111)  RR: 13 (11 - 25)  SpO2: 97% (96% - 100%)      GEN: NAD, pleasant  HEENT: normocephalic and atraumatic. EOMI. NANCY. Moist mucosa. Clear Posterior pharynx.  NECK: Supple. No carotid bruits.  No lymphadenopathy or thyromegaly.  LUNGS: POST OP TUBESn.  HEART: Regular rate and rhythm without murmur.  ABDOMEN: Soft, nontender, and nondistended.  Positive bowel sounds.  No hepatosplenomegaly was noted.  EXTREMITIES: Without any cyanosis, clubbing, rash, lesions or edema.  NEUROLOGIC: Cranial nerves II through XII are grossly intact.  MUSCULOSKELETAL:  SKIN: No ulceration or induration present    LABS:                        10.1   9.0   )-----------( 389      ( 25 Mar 2017 04:56 )             30.3       25 Mar 2017 04:56    132    |  97     |  8.0    ----------------------------<  199    4.1     |  27.0   |  0.31     Ca    8.0        25 Mar 2017 04:56          03-25 @ 04:56  hct 30.3 % hgb 10.1 g/dL    03-25 @ 04:56  plat 389 K/uL wbc 9.0 K/uL    03-23 @ 05:19  hct 33.4 % hgb 11.1 g/dL    03-23 @ 05:19  plat 473 K/uL wbc 8.2 K/uL    03-22 @ 22:09  hct 35.9 % hgb 12.1 g/dL    03-22 @ 22:09  plat 503 K/uL wbc 8.8 K/uL      03-25-17  creat 0.31 mg/dL gfr 193 mL/min/1.73M2 bun 8.0 mg/dL k 4.1 mmol/L  03-23-17  creat 0.47 mg/dL gfr 162 mL/min/1.73M2 bun 9.0 mg/dL k 4.0 mmol/L  03-22-17  creat 0.48 mg/dL gfr 161 mL/min/1.73M2 bun 9.0 mg/dL k 3.8 mmol/L      MICROBIOLOGY:  Culture Results:   Rare Staphylococcus aureus  Moderate Candida species  Numerous Routine respiratory april present (03-23 @ 11:12)  Culture Results:   No growth at 48 hours (03-23 @ 01:51)  Culture Results:   No growth at 48 hours (03-23 @ 01:50)  Culture Results:   No growth at 48 hours (03-23 @ 01:50)  Culture Results:   No growth (03-23 @ 00:43)        RADIOLOGY & ADDITIONAL STUDIES:          RALEIGH WHITTINGTON MD FACP

## 2017-03-26 NOTE — PROGRESS NOTE ADULT - ASSESSMENT
41M who originally presented to [facility] for pneumonia and was found to have lung abscesses requiring transfer to [facility] for further management. The patient was continued on intravenous antibiotics for empyema. He was seen by Cardiothoracic Surgery in consultation with recommendation to pursue surgical intervention and possibly decortication. The patient was seen by Infectious Disease in consultation and continued on intravenous antibiotics. CT of the chest noted no evidence of mediastinal or hilar lymphadenopathy or pericardial effusion, noted a loculated right pleural effusion, pleural thickening surrounding the loculated effusion, consolidation of the right lower lobe, cavitation within the consolidated right lower lobe with small fluid pockets within the lung. HIV screen was noted to be negative and the patient was placed in isolation while awaiting further evaluation for possible tuberculosis.  S/p VATS, Decortication right lung, with clinical improvement. Sputum culture with MSSA sans bacteremia. Intra-operative cultures with rare Staph. aureus. Remains afebrile without leukocytosis. Being treated for TB empirically.   DMT2 uncontrolled, A1c >12, improving in patient hyperglycemia.  At risk for severe Protein Calorie Malnutrition given low albumin.  Anemia, Normocytic appears stable.  Hypertension - currently normotensive on Phenylephrine.

## 2017-03-27 LAB
-  AMPICILLIN/SULBACTAM: SIGNIFICANT CHANGE UP
-  CEFAZOLIN: SIGNIFICANT CHANGE UP
-  CIPROFLOXACIN: SIGNIFICANT CHANGE UP
-  CLINDAMYCIN: SIGNIFICANT CHANGE UP
-  ERYTHROMYCIN: SIGNIFICANT CHANGE UP
-  GENTAMICIN: SIGNIFICANT CHANGE UP
-  LEVOFLOXACIN: SIGNIFICANT CHANGE UP
-  MOXIFLOXACIN(AEROBIC): SIGNIFICANT CHANGE UP
-  OXACILLIN: SIGNIFICANT CHANGE UP
-  RIFAMPIN: SIGNIFICANT CHANGE UP
-  TETRACYCLINE: SIGNIFICANT CHANGE UP
-  TRIMETHOPRIM/SULFAMETHOXAZOLE: SIGNIFICANT CHANGE UP
-  VANCOMYCIN: SIGNIFICANT CHANGE UP
ANION GAP SERPL CALC-SCNC: 9 MMOL/L — SIGNIFICANT CHANGE UP (ref 5–17)
BUN SERPL-MCNC: 6 MG/DL — LOW (ref 8–20)
CALCIUM SERPL-MCNC: 8.1 MG/DL — LOW (ref 8.6–10.2)
CHLORIDE SERPL-SCNC: 97 MMOL/L — LOW (ref 98–107)
CO2 SERPL-SCNC: 27 MMOL/L — SIGNIFICANT CHANGE UP (ref 22–29)
CREAT SERPL-MCNC: 0.3 MG/DL — LOW (ref 0.5–1.3)
GLUCOSE SERPL-MCNC: 168 MG/DL — HIGH (ref 70–115)
HCT VFR BLD CALC: 29.2 % — LOW (ref 42–52)
HGB BLD-MCNC: 9.9 G/DL — LOW (ref 14–18)
MAGNESIUM SERPL-MCNC: 2 MG/DL — SIGNIFICANT CHANGE UP (ref 1.8–2.5)
MCHC RBC-ENTMCNC: 28.9 PG — SIGNIFICANT CHANGE UP (ref 27–31)
MCHC RBC-ENTMCNC: 33.9 G/DL — SIGNIFICANT CHANGE UP (ref 32–36)
MCV RBC AUTO: 85.1 FL — SIGNIFICANT CHANGE UP (ref 80–94)
METHOD TYPE: SIGNIFICANT CHANGE UP
PLATELET # BLD AUTO: 408 K/UL — HIGH (ref 150–400)
POTASSIUM SERPL-MCNC: 4.1 MMOL/L — SIGNIFICANT CHANGE UP (ref 3.5–5.3)
POTASSIUM SERPL-SCNC: 4.1 MMOL/L — SIGNIFICANT CHANGE UP (ref 3.5–5.3)
RBC # BLD: 3.43 M/UL — LOW (ref 4.6–6.2)
RBC # FLD: 13.2 % — SIGNIFICANT CHANGE UP (ref 11–15.6)
SODIUM SERPL-SCNC: 133 MMOL/L — LOW (ref 135–145)
WBC # BLD: 5.3 K/UL — SIGNIFICANT CHANGE UP (ref 4.8–10.8)
WBC # FLD AUTO: 5.3 K/UL — SIGNIFICANT CHANGE UP (ref 4.8–10.8)

## 2017-03-27 PROCEDURE — 99233 SBSQ HOSP IP/OBS HIGH 50: CPT

## 2017-03-27 PROCEDURE — 71010: CPT | Mod: 26

## 2017-03-27 RX ORDER — ALBUMIN HUMAN 25 %
250 VIAL (ML) INTRAVENOUS ONCE
Qty: 0 | Refills: 0 | Status: COMPLETED | OUTPATIENT
Start: 2017-03-27 | End: 2017-03-27

## 2017-03-27 RX ORDER — SODIUM CHLORIDE 9 MG/ML
1000 INJECTION INTRAMUSCULAR; INTRAVENOUS; SUBCUTANEOUS ONCE
Qty: 0 | Refills: 0 | Status: COMPLETED | OUTPATIENT
Start: 2017-03-27 | End: 2017-03-27

## 2017-03-27 RX ADMIN — Medication 100 MILLIGRAM(S): at 21:38

## 2017-03-27 RX ADMIN — INSULIN GLARGINE 10 UNIT(S): 100 INJECTION, SOLUTION SUBCUTANEOUS at 21:38

## 2017-03-27 RX ADMIN — Medication 125 MILLILITER(S): at 02:14

## 2017-03-27 RX ADMIN — Medication 2: at 11:42

## 2017-03-27 RX ADMIN — Medication 2: at 08:14

## 2017-03-27 RX ADMIN — SODIUM CHLORIDE 50 MILLILITER(S): 9 INJECTION, SOLUTION INTRAVENOUS at 11:43

## 2017-03-27 RX ADMIN — SODIUM CHLORIDE 1000 MILLILITER(S): 9 INJECTION INTRAMUSCULAR; INTRAVENOUS; SUBCUTANEOUS at 09:28

## 2017-03-27 RX ADMIN — MEROPENEM 200 MILLIGRAM(S): 1 INJECTION INTRAVENOUS at 21:39

## 2017-03-27 RX ADMIN — MEROPENEM 200 MILLIGRAM(S): 1 INJECTION INTRAVENOUS at 14:20

## 2017-03-27 RX ADMIN — Medication 3 UNIT(S): at 16:53

## 2017-03-27 RX ADMIN — Medication 6: at 21:38

## 2017-03-27 RX ADMIN — ENOXAPARIN SODIUM 40 MILLIGRAM(S): 100 INJECTION SUBCUTANEOUS at 11:35

## 2017-03-27 RX ADMIN — Medication 3 UNIT(S): at 08:14

## 2017-03-27 RX ADMIN — MEROPENEM 200 MILLIGRAM(S): 1 INJECTION INTRAVENOUS at 06:00

## 2017-03-27 RX ADMIN — Medication 100 MILLIGRAM(S): at 11:36

## 2017-03-27 RX ADMIN — Medication 3 UNIT(S): at 11:43

## 2017-03-27 RX ADMIN — Medication 3 UNIT(S): at 21:38

## 2017-03-27 RX ADMIN — Medication 4: at 16:53

## 2017-03-27 NOTE — PROGRESS NOTE ADULT - SUBJECTIVE AND OBJECTIVE BOX
41y Male s/p VATS (video-assisted thoracoscopic surgery)      Subjective: patient has no complaints    T(C): 37.2, Max: 37.2 (03-26 @ 23:50)  HR: 83 (75 - 87)  BP: 109/74 (71/43 - 145/91)  RR: 17 (14 - 30)  SpO2: 98% (95% - 99%        26 Mar 2017 03:45    131    |  94     |  9.0    ----------------------------<  113    3.9     |  28.0   |  0.39     Ca    7.7        26 Mar 2017 03:45  Mg     1.9       26 Mar 2017 03:45                                 9.2    5.0   )-----------( 382      ( 26 Mar 2017 03:45 )             27.1                     CAPILLARY BLOOD GLUCOSE  105 (26 Mar 2017 23:50)  229 (26 Mar 2017 22:00)  119 (26 Mar 2017 17:00)  109 (26 Mar 2017 12:00)  83 (26 Mar 2017 08:00)           CXR:   Postoperative changes as described..        I&O's Detail  I & Os for 24h ending 26 Mar 2017 07:00  =============================================  IN:    lactated ringers.: 1200 ml    Oral Fluid: 780 ml    Albumin 5%  - 250 mL: 250 ml    Solution: 100 ml    phenylephrine   Infusion: 55.9 ml    Total IN: 2385.9 ml  ---------------------------------------------  OUT:    Voided: 1325 ml    Chest Tube: 95 ml    Chest Tube: 90 ml    Total OUT: 1510 ml  ---------------------------------------------  Total NET: 875.9 ml    I & Os for current day (as of 27 Mar 2017 01:03)  =============================================  IN:    Oral Fluid: 1560 ml    lactated ringers.: 800 ml    Sodium Chloride 0.9% IV Bolus: 500 ml    Albumin 5%  - 250 mL: 250 ml    Solution: 200 ml    Total IN: 3310 ml  ---------------------------------------------  OUT:    Voided: 2175 ml    Chest Tube: 10 ml    Total OUT: 2185 ml  ---------------------------------------------  Total NET: 1125 ml      MEDICATIONS  (STANDING):  enoxaparin Injectable 40milliGRAM(s) SubCutaneous every 24 hours  docusate sodium 100milliGRAM(s) Oral three times a day  insulin glargine Injectable (LANTUS) 10Unit(s) SubCutaneous at bedtime  lactated ringers. 1000milliLiter(s) IV Continuous <Continuous>  meropenem IVPB 1000milliGRAM(s) IV Intermittent every 8 hours  phenylephrine    Infusion 0.5MICROgram(s)/kG/Min IV Continuous <Continuous>  lactated ringers Bolus 500milliLiter(s) IV Bolus once  pyridoxine 100milliGRAM(s) Oral daily  insulin lispro Injectable (HumaLOG) 3Unit(s) SubCutaneous Before meals and at bedtime  insulin lispro (HumaLOG) corrective regimen sliding scale  SubCutaneous Before meals and at bedtime    MEDICATIONS  (PRN):  oxyCODONE  5 mG/acetaminophen 325 mG 1Tablet(s) Oral every 6 hours PRN Moderate Pain (4 - 6)      Physical Exam  Neuro: A+O x 3, non-focal, speech clear and intact  Pulm: CTA, equal bilaterally.  chest tubes in place  CV: RRR, +S1S2  Abd: soft, NT, ND, +BS  Ext: REAVES x 4, no edema  Inc: dressing clean and dry    -

## 2017-03-27 NOTE — CONSULT NOTE ADULT - SUBJECTIVE AND OBJECTIVE BOX
HPI:  40 y/o M w/hx recent dx new onset dm. Pt was transferred from [facility]  due to several areas right lung abscess, and moderate size loculated empyema. Pt was tx w/vanco/meropenem. Had productive cough, yellowish..and pleuritic cp, everytime he coughs... he also states loosing approx 10 lbs during the past month.. Denies sob,abd pain, dysuria, palpitations, weakness, headache, diarrhea or any further complaints. He was found to have empyema and had decortication and now has chest tube drain. He was told he had diabetes during initial admission at [facility]. Has mother with diabetes.     PAST MEDICAL & SURGICAL HISTORY:  Diabetes  No significant past surgical history    FAMILY HISTORY:  No pertinent family history in first degree relatives      SOCIAL HISTORY:, has a  daughter in [facility], works in restaurant.     REVIEW OF SYSTEMS:    Constitutional: No fever, no chills, no change in weight.  Eyes: No eye swelling,no  blurry vision, no redness, no loss of vision.  Neck: No neck pain, no change in voice.  Lungs: No shortness of breath, no wheezing, no cough  CV: No chest pain, no palpitations, no pain with walking.  GI: No nausea, no vomiting, no constipation, no diarrhea, no abdominal pain  : No urinary frequency, no blood in urine, no urinary burning, no difficulty voiding.  Musculoskeletal: No muscle pain, no joint pain, no swelling.  Skin: No rash, no infections.  Neurologic: No headaches, no weakness, no burning or pain in feet, no tremor.  Endocrine: No heat intolerance, no cold intolerance, no increased sweating, no shakiness between meals.  Psych: No depression, no anxiety, no trouble concentrating    MEDICATIONS  (STANDING):  enoxaparin Injectable 40milliGRAM(s) SubCutaneous every 24 hours  docusate sodium 100milliGRAM(s) Oral three times a day  insulin glargine Injectable (LANTUS) 10Unit(s) SubCutaneous at bedtime  lactated ringers. 1000milliLiter(s) IV Continuous <Continuous>  meropenem IVPB 1000milliGRAM(s) IV Intermittent every 8 hours  lactated ringers Bolus 500milliLiter(s) IV Bolus once  pyridoxine 100milliGRAM(s) Oral daily  insulin lispro Injectable (HumaLOG) 3Unit(s) SubCutaneous Before meals and at bedtime  insulin lispro (HumaLOG) corrective regimen sliding scale  SubCutaneous Before meals and at bedtime    MEDICATIONS  (PRN):  oxyCODONE  5 mG/acetaminophen 325 mG 1Tablet(s) Oral every 6 hours PRN Moderate Pain (4 - 6)    Allergies  No Known Allergies    CAPILLARY BLOOD GLUCOSE  162 (27 Mar 2017 08:00)    PHYSICAL EXAM:    Vital Signs Last 24 Hrs  T(C): 36.8, Max: 37.6 (03-27 @ 07:10)  T(F): 98.3, Max: 99.7 (03-27 @ 07:10)  HR: 82 (76 - 90)  BP: 78/50 (71/43 - 145/91)  BP(mean): 59 (52 - 113)  RR: 18 (14 - 30)  SpO2: 96% (95% - 99%)    General appearance: Well developed, well nourished.  Eyes: Pupils equal and reactive to light. EOM full. No exophthalmos.  Neck: Trachea midline. No thyroid enlargement.  Lungs: Normal respiratory excursion. Lungs clear. Chest tube in place R side.  CV: Regular cardiac rhythm. No murmur. Carotid and pedal pulses intact.  Abdomen: Soft, non tender, no organomegaly or mass.  Musculoskeletal: No cyanosis, clubbing, or edema. No pedal lesions.  Skin: Warm and moist. No acanthosis. 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  Neuro: Cranial nerves intact. Normal motor and sensory function. DTR's normal.  Psych: Normal affect, good judgement.    LABS:                        9.9    5.3   )-----------( 408      ( 27 Mar 2017 04:46 )             29.2     27 Mar 2017 04:46    133    |  97     |  6.0    ----------------------------<  168    4.1     |  27.0   |  0.30     Ca    8.1        27 Mar 2017 04:46  Mg     2.0       27 Mar 2017 04:46      CAPILLARY BLOOD GLUCOSE  162 (27 Mar 2017 08:00)  105 (26 Mar 2017 23:50)  229 (26 Mar 2017 22:00)  119 (26 Mar 2017 17:00)  109 (26 Mar 2017 12:00)  83 (26 Mar 2017 08:00)  167 (25 Mar 2017 22:00)  172 (25 Mar 2017 17:00)  190 (25 Mar 2017 15:30)  223 (25 Mar 2017 12:00)  167 (25 Mar 2017 07:00)  188 (24 Mar 2017 19:24)  225 (24 Mar 2017 06:08)  225 (24 Mar 2017 01:17)  260 (23 Mar 2017 20:48)  234 (23 Mar 2017 18:00)  119 (23 Mar 2017 12:00)      RADIOLOGY & ADDITIONAL STUDIES:

## 2017-03-27 NOTE — PROGRESS NOTE ADULT - SUBJECTIVE AND OBJECTIVE BOX
HOSPITALIST PROGRESS NOTE    LYNN MELISSA  [id number]  41yMale    Patient is a 41y old  Male who presents with a chief complaint of fever, chills, cough (22 Mar 2017 20:10)      SUBJECTIVE: Chart reviewed since last visit. Patient seen and examined at bedside.      OBJECTIVE:  Vital Signs Last 24 Hrs  T(C): 36.9, Max: 37.6 (03-27 @ 07:10)  T(F): 98.5, Max: 99.7 (03-27 @ 07:10)  HR: 80 (76 - 90)  BP: 131/85 (71/43 - 141/87)  BP(mean): 105 (52 - 109)  RR: 22 (14 - 30)  SpO2: 97% (95% - 99%)    PHYSICAL EXAMINATION  General: NAD[+]   nontoxic[+]   ill-appearing[]  Obese[]  HEENT: AT/NC[+]  PERRLA[+]  EOMI[]   Moist oral mucosa[]  Pharyngeal exudates[]  NECK: Supple[+]  JVD[] Carotid bruit[]  CVS: RRR[+]  Irregular[]  S1+S2[]   Murmur[]  RESP: Fair air entry bilaterally[+]except Right lower lung with absent sounds   Clear sounds[]   poor effort []  wheeze[]   Crackles[+]RLL. Chest tube Right lung  GI: Soft[]  Nondistended[]   Nontender[]   Bowel Sounds[]  Mass[]   HSM[]   Ascites[]  : suprapubic tenderness[-]   CVA Tenderness[]   John[]  MS: FROM[+]   Edema[]  CNS: AAOx3[+]     INTEG: Skin is Warm[+]  dry[] Lesion[] Decubitus[]  PSYCH: Anxious Mood, Affect      MONITOR:  CAPILLARY BLOOD GLUCOSE  160 (27 Mar 2017 12:00)  162 (27 Mar 2017 08:00)  105 (26 Mar 2017 23:50)  229 (26 Mar 2017 22:00)  119 (26 Mar 2017 17:00)        I&O's Summary  I & Os for 24h ending 27 Mar 2017 07:00  =============================================  IN: 3760 ml / OUT: 2205 ml / NET: 1555 ml    I & Os for current day (as of 27 Mar 2017 16:02)  =============================================  IN: 2500 ml / OUT: 1300 ml / NET: 1200 ml                          9.9    5.3   )-----------( 408      ( 27 Mar 2017 04:46 )             29.2       27 Mar 2017 04:46    133    |  97     |  6.0    ----------------------------<  168    4.1     |  27.0   |  0.30     Ca    8.1        27 Mar 2017 04:46  Mg     2.0       27 Mar 2017 04:46        Culture - Body Fluid with Gram Stain (03.24.17 @ 22:50)    -  Gentamicin: S <=4    -  Tetra/Doxy: S <=4    Gram Stain:   Few White blood cells  No organisms seen    -  Ciprofloxacin: S <=1    -  Levofloxacin: S <=1    -  Moxifloxacin(Aerobic): S <=0.5    -  Oxacillin: S <=0.25    -  Trimethoprim/Sulfamethoxazole: S <=0.5/9.5    -  Ampicillin/Sulbactam: S <=8/4    -  Erythromycin: S <=0.5    -  Cefazolin: S <=4    -  Clindamycin: S <=0.5    -  RIF- Rifampin: S <=1    -  Vancomycin: S 2    Specimen Source: .Body Fluid right pleural effusion    Culture Results:   Rare Staphylococcus aureus  Culture in progress    Organism Identification: Staphylococcus aureus    Organism: Staphylococcus aureus    Method Type: JASON            MEDICATIONS  (STANDING):  enoxaparin Injectable 40milliGRAM(s) SubCutaneous every 24 hours  docusate sodium 100milliGRAM(s) Oral three times a day  insulin glargine Injectable (LANTUS) 10Unit(s) SubCutaneous at bedtime  lactated ringers. 1000milliLiter(s) IV Continuous <Continuous>  meropenem IVPB 1000milliGRAM(s) IV Intermittent every 8 hours  lactated ringers Bolus 500milliLiter(s) IV Bolus once  pyridoxine 100milliGRAM(s) Oral daily  insulin lispro Injectable (HumaLOG) 3Unit(s) SubCutaneous Before meals and at bedtime  insulin lispro (HumaLOG) corrective regimen sliding scale  SubCutaneous Before meals and at bedtime      MEDICATIONS  (PRN):  oxyCODONE  5 mG/acetaminophen 325 mG 1Tablet(s) Oral every 6 hours PRN Moderate Pain (4 - 6) HOSPITALIST PROGRESS NOTE    LYNN MELISSA  [id number]  41yMale    Patient is a 41y old  Male who presents with a chief complaint of fever, chills, cough (22 Mar 2017 20:10)      SUBJECTIVE: Chart reviewed since last visit. Patient seen and examined at bedside for empyema.  Continues to feel better.  per RN dizzy and with orthostatic hypotension on walking earlier today - given IVF  Chest pain at VATs, chest tube site. Denies dyspnea, chills or fever.        OBJECTIVE:  Vital Signs Last 24 Hrs  T(C): 36.9, Max: 37.6 (03-27 @ 07:10)  T(F): 98.5, Max: 99.7 (03-27 @ 07:10)  HR: 80 (76 - 90)  BP: 131/85 (71/43 - 141/87)  BP(mean): 105 (52 - 109)  RR: 22 (14 - 30)  SpO2: 97% (95% - 99%)    PHYSICAL EXAMINATION  General: NAD[+]   nontoxic[+]   ill-appearing[]  Obese[]  HEENT: AT/NC[+]  PERRLA[+]  EOMI[]   Moist oral mucosa[]  Pharyngeal exudates[]  NECK: Supple[+]  JVD[] Carotid bruit[]  CVS: RRR[+]  Irregular[]  S1+S2[]   Murmur[]  RESP: Fair air entry bilaterally[+]except Right lower lung with absent sounds   Clear sounds[]   poor effort []  wheeze[]   Crackles[+]RLL. Chest tube Right lung  GI: Soft[]  Nondistended[]   Nontender[]   Bowel Sounds[]  Mass[]   HSM[]   Ascites[]  : suprapubic tenderness[-]   CVA Tenderness[]   John[]  MS: FROM[+]   Edema[]  CNS: AAOx3[+]     INTEG: Skin is Warm[+]  dry[] Lesion[] Decubitus[]  PSYCH: Anxious Mood, Affect      MONITOR:  CAPILLARY BLOOD GLUCOSE  160 (27 Mar 2017 12:00)  162 (27 Mar 2017 08:00)  105 (26 Mar 2017 23:50)  229 (26 Mar 2017 22:00)  119 (26 Mar 2017 17:00)        I&O's Summary  I & Os for 24h ending 27 Mar 2017 07:00  =============================================  IN: 3760 ml / OUT: 2205 ml / NET: 1555 ml    I & Os for current day (as of 27 Mar 2017 16:02)  =============================================  IN: 2500 ml / OUT: 1300 ml / NET: 1200 ml                          9.9    5.3   )-----------( 408      ( 27 Mar 2017 04:46 )             29.2       27 Mar 2017 04:46    133    |  97     |  6.0    ----------------------------<  168    4.1     |  27.0   |  0.30     Ca    8.1        27 Mar 2017 04:46  Mg     2.0       27 Mar 2017 04:46        Culture - Body Fluid with Gram Stain (03.24.17 @ 22:50)    -  Gentamicin: S <=4    -  Tetra/Doxy: S <=4    Gram Stain:   Few White blood cells  No organisms seen    -  Ciprofloxacin: S <=1    -  Levofloxacin: S <=1    -  Moxifloxacin(Aerobic): S <=0.5    -  Oxacillin: S <=0.25    -  Trimethoprim/Sulfamethoxazole: S <=0.5/9.5    -  Ampicillin/Sulbactam: S <=8/4    -  Erythromycin: S <=0.5    -  Cefazolin: S <=4    -  Clindamycin: S <=0.5    -  RIF- Rifampin: S <=1    -  Vancomycin: S 2    Specimen Source: .Body Fluid right pleural effusion    Culture Results:   Rare Staphylococcus aureus  Culture in progress    Organism Identification: Staphylococcus aureus    Organism: Staphylococcus aureus    Method Type: JASON            MEDICATIONS  (STANDING):  enoxaparin Injectable 40milliGRAM(s) SubCutaneous every 24 hours  docusate sodium 100milliGRAM(s) Oral three times a day  insulin glargine Injectable (LANTUS) 10Unit(s) SubCutaneous at bedtime  lactated ringers. 1000milliLiter(s) IV Continuous <Continuous>  meropenem IVPB 1000milliGRAM(s) IV Intermittent every 8 hours  lactated ringers Bolus 500milliLiter(s) IV Bolus once  pyridoxine 100milliGRAM(s) Oral daily  insulin lispro Injectable (HumaLOG) 3Unit(s) SubCutaneous Before meals and at bedtime  insulin lispro (HumaLOG) corrective regimen sliding scale  SubCutaneous Before meals and at bedtime      MEDICATIONS  (PRN):  oxyCODONE  5 mG/acetaminophen 325 mG 1Tablet(s) Oral every 6 hours PRN Moderate Pain (4 - 6)

## 2017-03-27 NOTE — CONSULT NOTE ADULT - ASSESSMENT
Dm newly diagnosed in patient that had decortication for empyema.  he will need ERNST and ICA antibodies to determine what type of diabetes he has (can be done outpatient). Patient does not have medical insurance.   Continue for now with lantus 10 u HS and lispro 3 u TID w meals and SSI coverage as needed.   Bgs in good control with ocasional high 180-200.

## 2017-03-27 NOTE — PROGRESS NOTE ADULT - ASSESSMENT
41M who originally presented to [facility] for pneumonia and was found to have lung abscesses requiring transfer to [facility] for further management. The patient was continued on intravenous antibiotics for empyema. He was seen by Cardiothoracic Surgery in consultation with recommendation to pursue surgical intervention and possibly decortication. The patient was seen by Infectious Disease in consultation and continued on intravenous antibiotics. CT of the chest noted no evidence of mediastinal or hilar lymphadenopathy or pericardial effusion, noted a loculated right pleural effusion, pleural thickening surrounding the loculated effusion, consolidation of the right lower lobe, cavitation within the consolidated right lower lobe with small fluid pockets within the lung. HIV screen was noted to be negative and the patient was placed in isolation while awaiting further evaluation for possible tuberculosis.  S/p VATS, Decortication right lung, with clinical improvement. Sputum culture with MSSA sans bacteremia. Intra-operative cultures with rare Staph. aureus. Remains afebrile without leukocytosis. Being treated for TB empirically. AFB stain negative, cultures pending.  DMT2 uncontrolled, A1c >12, improving in patient hyperglycemia.   At risk for severe Protein Calorie Malnutrition given low albumin.  Anemia, Normocytic appears stable.  Hypertension - currently normotensive off Phenylephrine, but continues to be symptomatic from orthostatic hypotension.

## 2017-03-28 LAB
ANION GAP SERPL CALC-SCNC: 8 MMOL/L — SIGNIFICANT CHANGE UP (ref 5–17)
BUN SERPL-MCNC: 4 MG/DL — LOW (ref 8–20)
CALCIUM SERPL-MCNC: 8.2 MG/DL — LOW (ref 8.6–10.2)
CHLORIDE SERPL-SCNC: 98 MMOL/L — SIGNIFICANT CHANGE UP (ref 98–107)
CO2 SERPL-SCNC: 27 MMOL/L — SIGNIFICANT CHANGE UP (ref 22–29)
CREAT SERPL-MCNC: 0.34 MG/DL — LOW (ref 0.5–1.3)
GLUCOSE SERPL-MCNC: 185 MG/DL — HIGH (ref 70–115)
HCT VFR BLD CALC: 27.2 % — LOW (ref 42–52)
HGB BLD-MCNC: 9.3 G/DL — LOW (ref 14–18)
MAGNESIUM SERPL-MCNC: 1.9 MG/DL — SIGNIFICANT CHANGE UP (ref 1.8–2.5)
MCHC RBC-ENTMCNC: 29 PG — SIGNIFICANT CHANGE UP (ref 27–31)
MCHC RBC-ENTMCNC: 34.2 G/DL — SIGNIFICANT CHANGE UP (ref 32–36)
MCV RBC AUTO: 84.7 FL — SIGNIFICANT CHANGE UP (ref 80–94)
PLATELET # BLD AUTO: 380 K/UL — SIGNIFICANT CHANGE UP (ref 150–400)
POTASSIUM SERPL-MCNC: 4 MMOL/L — SIGNIFICANT CHANGE UP (ref 3.5–5.3)
POTASSIUM SERPL-SCNC: 4 MMOL/L — SIGNIFICANT CHANGE UP (ref 3.5–5.3)
RBC # BLD: 3.21 M/UL — LOW (ref 4.6–6.2)
RBC # FLD: 13.2 % — SIGNIFICANT CHANGE UP (ref 11–15.6)
SODIUM SERPL-SCNC: 133 MMOL/L — LOW (ref 135–145)
WBC # BLD: 4.6 K/UL — LOW (ref 4.8–10.8)
WBC # FLD AUTO: 4.6 K/UL — LOW (ref 4.8–10.8)

## 2017-03-28 PROCEDURE — 71010: CPT | Mod: 26,76

## 2017-03-28 PROCEDURE — 99233 SBSQ HOSP IP/OBS HIGH 50: CPT

## 2017-03-28 PROCEDURE — 71010: CPT | Mod: 26,77

## 2017-03-28 RX ORDER — NAFCILLIN 10 G/100ML
2 INJECTION, POWDER, FOR SOLUTION INTRAVENOUS ONCE
Qty: 0 | Refills: 0 | Status: COMPLETED | OUTPATIENT
Start: 2017-03-28 | End: 2017-03-28

## 2017-03-28 RX ORDER — NAFCILLIN 10 G/100ML
INJECTION, POWDER, FOR SOLUTION INTRAVENOUS
Qty: 0 | Refills: 0 | Status: DISCONTINUED | OUTPATIENT
Start: 2017-03-28 | End: 2017-04-01

## 2017-03-28 RX ORDER — INSULIN LISPRO 100/ML
4 VIAL (ML) SUBCUTANEOUS
Qty: 0 | Refills: 0 | Status: DISCONTINUED | OUTPATIENT
Start: 2017-03-28 | End: 2017-03-29

## 2017-03-28 RX ORDER — INSULIN GLARGINE 100 [IU]/ML
12 INJECTION, SOLUTION SUBCUTANEOUS AT BEDTIME
Qty: 0 | Refills: 0 | Status: DISCONTINUED | OUTPATIENT
Start: 2017-03-28 | End: 2017-03-31

## 2017-03-28 RX ORDER — NAFCILLIN 10 G/100ML
2 INJECTION, POWDER, FOR SOLUTION INTRAVENOUS EVERY 4 HOURS
Qty: 0 | Refills: 0 | Status: DISCONTINUED | OUTPATIENT
Start: 2017-03-28 | End: 2017-04-01

## 2017-03-28 RX ADMIN — Medication 4: at 12:53

## 2017-03-28 RX ADMIN — Medication 3 UNIT(S): at 12:53

## 2017-03-28 RX ADMIN — Medication 3 UNIT(S): at 08:41

## 2017-03-28 RX ADMIN — MEROPENEM 200 MILLIGRAM(S): 1 INJECTION INTRAVENOUS at 05:33

## 2017-03-28 RX ADMIN — Medication 2: at 21:40

## 2017-03-28 RX ADMIN — Medication 100 MILLIGRAM(S): at 12:57

## 2017-03-28 RX ADMIN — Medication 100 MILLIGRAM(S): at 05:33

## 2017-03-28 RX ADMIN — Medication 100 MILLIGRAM(S): at 21:40

## 2017-03-28 RX ADMIN — NAFCILLIN 200 GRAM(S): 10 INJECTION, POWDER, FOR SOLUTION INTRAVENOUS at 17:37

## 2017-03-28 RX ADMIN — Medication 4 UNIT(S): at 17:35

## 2017-03-28 RX ADMIN — MEROPENEM 200 MILLIGRAM(S): 1 INJECTION INTRAVENOUS at 13:19

## 2017-03-28 RX ADMIN — ENOXAPARIN SODIUM 40 MILLIGRAM(S): 100 INJECTION SUBCUTANEOUS at 21:41

## 2017-03-28 RX ADMIN — Medication 2: at 08:41

## 2017-03-28 RX ADMIN — INSULIN GLARGINE 12 UNIT(S): 100 INJECTION, SOLUTION SUBCUTANEOUS at 21:41

## 2017-03-28 RX ADMIN — Medication 100 MILLIGRAM(S): at 12:53

## 2017-03-28 RX ADMIN — Medication 10: at 17:36

## 2017-03-28 RX ADMIN — NAFCILLIN 200 GRAM(S): 10 INJECTION, POWDER, FOR SOLUTION INTRAVENOUS at 21:41

## 2017-03-28 NOTE — PROGRESS NOTE ADULT - ASSESSMENT
41M who originally presented to [facility] for pneumonia and was found to have lung abscesses requiring transfer to [facility] for further management. The patient was continued on intravenous antibiotics for empyema. He was seen by Cardiothoracic Surgery in consultation with recommendation to pursue surgical intervention and possibly decortication. The patient was seen by Infectious Disease in consultation and continued on intravenous antibiotics. CT of the chest noted no evidence of mediastinal or hilar lymphadenopathy or pericardial effusion, noted a loculated right pleural effusion, pleural thickening surrounding the loculated effusion, consolidation of the right lower lobe, cavitation within the consolidated right lower lobe with small fluid pockets within the lung. HIV screen was noted to be negative and the patient was placed in isolation while awaiting further evaluation for possible tuberculosis.  S/p VATS, Decortication right lung (03/24/17), with clinical improvement. Sputum culture with MSSA sans bacteremia. Intra-operative cultures with rare Staph. aureus. Remains afebrile without leukocytosis. Being treated for TB empirically. AFB stain negative, cultures pending.  DMT2 uncontrolled, A1c >12, improving in-patient hyperglycemia.   At risk for severe Protein Calorie Malnutrition given low albumin.  Anemia, Normocytic appears stable.  Hypertension - currently normotensive, no longer having orthostatic hypotension.

## 2017-03-28 NOTE — PROGRESS NOTE ADULT - ASSESSMENT
Assessment  Patient is a 41y old  Male who presents with a chief complaint of fever, chills, cough (22 Mar 2017 20:10). PT found to have right pneumonia, treated with antibiotics, worsening chest pain then found to have right empyema.     On 3/24, patient underwent VATS (video-assisted thoracoscopic surgery), decortication.    Postoperative course/issues: hypotension requiring ICU monitoring and IVF/Albumin. Chest tube removed.    PLAN  Neuro: Pain management, Fentanyl PCA until tubes removed  Pulm: Encourage coughing, deep breathing and use of incentive spirometry. Wean off supplemental oxygen as able. Daily CXR.   Cardio: Monitor telemetry/alarms  GI: Tolerating diet. Continue stool softeners.  Renal: Daily BMP, supplement electrolytes as needed  Vasc: Lovenox/SCDs for DVT prophylaxis  Heme: Stable H/H. Trend CBC daily.   ID: Off antibiotics. Stable.  Therapy: OOB/ambulate  Tubes:   Disposition: Aim to D/C to home on  Discussed with Cardiothoracic Team at AM rounds.

## 2017-03-28 NOTE — PROGRESS NOTE ADULT - ASSESSMENT
Dm newly diagnosed in patient that had decortication for empyema.  Bgs slightly high, probably duu to recent infection as well.   Increase lantus to 12 u HS and lisrpo to 4 u TID w meals   Continue SSI coverage as needed.

## 2017-03-28 NOTE — PROGRESS NOTE ADULT - SUBJECTIVE AND OBJECTIVE BOX
HOSPITALIST PROGRESS NOTE    LYNN MELISSA  [id number]  41yMale    Patient is a 41y old  Male who presents with a chief complaint of fever, chills, cough (22 Mar 2017 20:10)      SUBJECTIVE: Chart reviewed since last visit. Patient seen and examined at bedside earlier for MSSA empyema.  Feels good  Mild pain at tube insertion site - one removed earlier by CT surgery  Denies dyspnea, cough, chills.  No longer feels dizzy on ambulating    OBJECTIVE:  Vital Signs Last 24 Hrs  T(C): 36.7, Max: 37.1 (03-27 @ 22:25)  T(F): 98, Max: 98.7 (03-27 @ 22:25)  HR: 87 (77 - 87)  BP: 120/70 (84/66 - 149/96)  BP(mean): 74 (71 - 80)  RR: 18 (16 - 21)  SpO2: 98% (95% - 98%)    PHYSICAL EXAMINATION  General: NAD[+]   nontoxic[+]   ill-appearing[]  Obese[]  HEENT: AT/NC[+]  PERRLA[+]  EOMI[]   Moist oral mucosa[]  Pharyngeal exudates[]  NECK: Supple[+]  JVD[] Carotid bruit[]  CVS: RRR[+]  Irregular[]  S1+S2[+]   Murmur[]  RESP: Fair air entry bilaterally[+]except Right lower lung with absent sounds   Clear sounds[]   poor effort []  wheeze[]   Crackles[+]RLL. Chest tube Right lung  GI: Soft[+]  Nondistended[+]   Nontender[+]   Bowel Sounds[+]  Mass[]   =  : suprapubic tenderness[-]   CVA Tenderness[]   John[]  MS: FROM[+]   Edema[]  CNS: AAOx3[+]     INTEG: Skin is Warm[+]  dry[] Lesion[] Decubitus[]  PSYCH: Fair Mood, Affect    MONITOR:  CAPILLARY BLOOD GLUCOSE  248 (28 Mar 2017 12:59)  181 (28 Mar 2017 08:00)  282 (27 Mar 2017 20:00)  209 (27 Mar 2017 17:00)        I&O's Summary  I & Os for 24h ending 28 Mar 2017 07:00  =============================================  IN: 3550 ml / OUT: 2305 ml / NET: 1245 ml    I & Os for current day (as of 28 Mar 2017 15:20)  =============================================  IN: 300 ml / OUT: 800 ml / NET: -500 ml                          9.3    4.6   )-----------( 380      ( 28 Mar 2017 05:03 )             27.2       28 Mar 2017 05:03    133    |  98     |  4.0    ----------------------------<  185    4.0     |  27.0   |  0.34     Ca    8.2        28 Mar 2017 05:03  Mg     1.9       28 Mar 2017 05:03          MEDICATIONS  (STANDING):  enoxaparin Injectable 40milliGRAM(s) SubCutaneous every 24 hours  docusate sodium 100milliGRAM(s) Oral three times a day  lactated ringers. 1000milliLiter(s) IV Continuous <Continuous>  meropenem IVPB 1000milliGRAM(s) IV Intermittent every 8 hours  pyridoxine 100milliGRAM(s) Oral daily  insulin lispro (HumaLOG) corrective regimen sliding scale  SubCutaneous Before meals and at bedtime  insulin glargine Injectable (LANTUS) 12Unit(s) SubCutaneous at bedtime  insulin lispro Injectable (HumaLOG) 4Unit(s) SubCutaneous three times a day before meals      MEDICATIONS  (PRN):  oxyCODONE  5 mG/acetaminophen 325 mG 1Tablet(s) Oral every 6 hours PRN Moderate Pain (4 - 6)

## 2017-03-28 NOTE — PROGRESS NOTE ADULT - PROBLEM SELECTOR PLAN 3
No AFB on smear so was taken off TB medications and off isolation  Need to follow up QuantiFeron gold

## 2017-03-28 NOTE — PROGRESS NOTE ADULT - SUBJECTIVE AND OBJECTIVE BOX
[facility]  INFECTIOUS DISEASES AND INTERNAL MEDICINE OF [place]  =======================================================  Papito Castorena MD  Diplomates American Board of Internal Medicine and Infectious Diseases  =======================================================    BELÉN, LYNN     No complaints  Feels well    Allergies:  No Known Allergies      Antibiotics:  meropenem IVPB 1000milliGRAM(s) IV Intermittent every 8 hours       REVIEW OF SYSTEMS:  CONSTITUTIONAL:  No fevers or chills  HEENT: No diplopia or blurred vision. No earache, sore throat or runny nose.  CARDIOVASCULAR:  No pressure, squeezing, strangling, tightness, heaviness or aching about the chest, neck, axilla or epigastrium.  RESPIRATORY:  No cough, shortness of breath  GASTROINTESTINAL:  No nausea, vomiting or diarrhea.  GENITOURINARY:  No dysuria, frequency or urgency.   MUSCULOSKELETAL:  no joint aches, no muscle pain  SKIN:  No change in skin, hair or nails.  NEUROLOGIC:  No paresthesias, fasciculations, seizures or weakness.  PSYCHIATRIC:  No disorder of thought or mood.  ENDOCRINE:  No heat or cold intolerance, polyuria or polydipsia.  HEMATOLOGICAL:  No easy bruising or bleeding.       Physical Exam:  Vital Signs Last 24 Hrs  T(C): 36.7, Max: 37.1 (03-27 @ 22:25)  T(F): 98, Max: 98.7 (03-27 @ 22:25)  HR: 87 (78 - 87)  BP: 120/70 (84/66 - 149/96)  BP(mean): 74 (71 - 74)  RR: 18 (16 - 21)  SpO2: 98% (95% - 98%)    GEN: NAD, pleasant  HEENT: normocephalic and atraumatic. EOMI. PERRL.    NECK: Supple.  LUNGS: Clear to auscultation. + chest tube  HEART: Regular rate and rhythm   ABDOMEN: Soft, nontender, and nondistended.  Positive bowel sounds.    : No CVA tenderness  EXTREMITIES: Without any edema.  MSK; No Joint effusion  NEUROLOGIC: Cranial nerves II through XII are grossly intact.  PSYCHIATRIC: Appropriate affect .  SKIN: No ulceration or induration present.        Labs:  28 Mar 2017 05:03    133    |  98     |  4.0    ----------------------------<  185    4.0     |  27.0   |  0.34     Ca    8.2        28 Mar 2017 05:03  Mg     1.9       28 Mar 2017 05:03                            9.3    4.6   )-----------( 380      ( 28 Mar 2017 05:03 )             27.2       RECENT CULTURES:  03-25 .Body Fluid right pleural effusion XXXX XXXX XXXX    03-24 .Body Fluid right pleural effusion Staphylococcus aureus   Few White blood cells  No organisms seen   Rare Staphylococcus aureus  Culture in progress    03-24 .Tissue contents of decortication XXXX   Few White blood cells  No organisms seen   No growth at 3 days.  Culture in progress    03-23 .Sputum Sputum Staphylococcus aureus   Few White blood cells  No organisms seen   Rare Staphylococcus aureus  Moderate Candida species  Numerous Routine respiratory april present    03-23 .Blood Blood XXXX XXXX   No growth at 48 hours    03-23 .Blood Blood XXXX XXXX   No growth at 48 hours    03-23 .Urine Clean Catch (Midstream) XXXX XXXX   No growth

## 2017-03-28 NOTE — PROGRESS NOTE ADULT - SUBJECTIVE AND OBJECTIVE BOX
INTERVAL HPI/OVERNIGHT EVENTS:    feeling well, eating well, good appetite .  BGs still running high 183-280.     MEDICATIONS  (STANDING):  enoxaparin Injectable 40milliGRAM(s) SubCutaneous every 24 hours  docusate sodium 100milliGRAM(s) Oral three times a day  insulin glargine Injectable (LANTUS) 10Unit(s) SubCutaneous at bedtime  lactated ringers. 1000milliLiter(s) IV Continuous <Continuous>  meropenem IVPB 1000milliGRAM(s) IV Intermittent every 8 hours  pyridoxine 100milliGRAM(s) Oral daily  insulin lispro Injectable (HumaLOG) 3Unit(s) SubCutaneous Before meals and at bedtime  insulin lispro (HumaLOG) corrective regimen sliding scale  SubCutaneous Before meals and at bedtime    MEDICATIONS  (PRN):  oxyCODONE  5 mG/acetaminophen 325 mG 1Tablet(s) Oral every 6 hours PRN Moderate Pain (4 - 6)      Allergies  No Known Allergies    Review of systems: some chest pain at the tube insertion site, no SOB, no fever, no nausea, no vomitting, no diarrhea, no diplopia, no dizziness.     Vital Signs Last 24 Hrs  T(C): 36.7, Max: 37.1 (03-27 @ 22:25)  T(F): 98, Max: 98.7 (03-27 @ 22:25)  HR: 87 (77 - 87)  BP: 120/70 (84/66 - 149/96)  BP(mean): 74 (71 - 116)  RR: 18 (16 - 21)  SpO2: 98% (95% - 98%)    PHYSICAL EXAM:  Constitutional: NAD, well-groomed, well-developed  HEENT: PERRLA, EOMI, no exophalmos  Neck: No LAD, No JVD, trachea midline, no thyroid enlargement  Respiratory: CTAB  Cardiovascular: S1 and S2, RRR, no M/G/R  Gastrointestinal: BS+, soft, no organomeglag or mass  Extremities: No peripheral edema, no pedal lesions  Vascular: 2+ peripheral pulses  Neurological: A/O x 3, no focal deficits  Psychiatric: Normal mood, normal affect  Musculoskeletal: 5/5 strength b/l upper and lower extremities  Skin: No rashes, no acanthosis    LABS:                        9.3    4.6   )-----------( 380      ( 28 Mar 2017 05:03 )             27.2     28 Mar 2017 05:03    133    |  98     |  4.0    ----------------------------<  185    4.0     |  27.0   |  0.34     Ca    8.2        28 Mar 2017 05:03  Mg     1.9       28 Mar 2017 05:03    CAPILLARY BLOOD GLUCOSE  248 (28 Mar 2017 12:59)  181 (28 Mar 2017 08:00)  282 (27 Mar 2017 20:00)  209 (27 Mar 2017 17:00)  160 (27 Mar 2017 12:00)  162 (27 Mar 2017 08:00)  105 (26 Mar 2017 23:50)  229 (26 Mar 2017 22:00)  119 (26 Mar 2017 17:00)  109 (26 Mar 2017 12:00)  83 (26 Mar 2017 08:00)  167 (25 Mar 2017 22:00)  172 (25 Mar 2017 17:00)  190 (25 Mar 2017 15:30)  223 (25 Mar 2017 12:00)  167 (25 Mar 2017 07:00)  188 (24 Mar 2017 19:24)      RADIOLOGY & ADDITIONAL TESTS:

## 2017-03-28 NOTE — PROGRESS NOTE ADULT - SUBJECTIVE AND OBJECTIVE BOX
Subjective: Pt seen and examined with [place]  at the bedside. Offers no complaints. Appears comfortable, seated at side of bed, NAD. Remaining right chest tube removed (minimal drainage, no air leak) without issue.    Vital Signs:  Vital Signs Last 24 Hrs  T(C): 36.7, Max: 37.1 (03-27 @ 22:25)  T(F): 98, Max: 98.7 (03-27 @ 22:25)  HR: 87 (78 - 87)  BP: 120/70 (84/66 - 149/96)  RR: 18 (16 - 21)  SpO2: 98% (95% - 98%)RA    Telemetry/Alarms: SR 80s    Relevant labs, radiology and Medications reviewed    Pertinent Physical Exam  General: WN/WD NAD  Neurology: A&Ox3, nonfocal, REAVES x 4  Respiratory: severely diminished breath sounds right base to mid chest  CV: RRR, S1S2, no murmurs, rubs or gallops  Abdominal: Soft, NT, ND +BS, Last BM today  Extremities: No edema, + peripheral pulses  Incisions: right VATS/tube incisions cdi    I & Os for 24h ending 03-28 @ 07:00  =============================================  IN:    Oral Fluid: 1800 ml    0.9% NaCl: 1000 ml    lactated ringers.: 650 ml    Solution: 100 ml    Total IN: 3550 ml  ---------------------------------------------  OUT:    Voided: 2275 ml    Chest Tube: 30 ml    Total OUT: 2305 ml  ---------------------------------------------  Total NET: 1245 ml    I & Os for current day (as of 03-28 @ 16:50)  =============================================  IN:    lactated ringers.: 300 ml    Total IN: 300 ml  ---------------------------------------------  OUT:    Voided: 800 ml    Total OUT: 800 ml  ---------------------------------------------  Total NET: -500 ml

## 2017-03-29 LAB
CULTURE RESULTS: SIGNIFICANT CHANGE UP
SPECIMEN SOURCE: SIGNIFICANT CHANGE UP
SURGICAL PATHOLOGY FINAL REPORT - CH: SIGNIFICANT CHANGE UP

## 2017-03-29 PROCEDURE — 99233 SBSQ HOSP IP/OBS HIGH 50: CPT

## 2017-03-29 RX ORDER — SODIUM CHLORIDE 9 MG/ML
1000 INJECTION INTRAMUSCULAR; INTRAVENOUS; SUBCUTANEOUS
Qty: 0 | Refills: 0 | Status: DISCONTINUED | OUTPATIENT
Start: 2017-03-29 | End: 2017-04-01

## 2017-03-29 RX ORDER — SODIUM CHLORIDE 9 MG/ML
1000 INJECTION INTRAMUSCULAR; INTRAVENOUS; SUBCUTANEOUS
Qty: 0 | Refills: 0 | Status: DISCONTINUED | OUTPATIENT
Start: 2017-03-29 | End: 2017-03-29

## 2017-03-29 RX ORDER — SODIUM CHLORIDE 9 MG/ML
1000 INJECTION, SOLUTION INTRAVENOUS ONCE
Qty: 0 | Refills: 0 | Status: COMPLETED | OUTPATIENT
Start: 2017-03-29 | End: 2017-03-29

## 2017-03-29 RX ORDER — INSULIN LISPRO 100/ML
7 VIAL (ML) SUBCUTANEOUS
Qty: 0 | Refills: 0 | Status: DISCONTINUED | OUTPATIENT
Start: 2017-03-29 | End: 2017-03-31

## 2017-03-29 RX ADMIN — NAFCILLIN 200 GRAM(S): 10 INJECTION, POWDER, FOR SOLUTION INTRAVENOUS at 22:03

## 2017-03-29 RX ADMIN — INSULIN GLARGINE 12 UNIT(S): 100 INJECTION, SOLUTION SUBCUTANEOUS at 22:03

## 2017-03-29 RX ADMIN — ENOXAPARIN SODIUM 40 MILLIGRAM(S): 100 INJECTION SUBCUTANEOUS at 11:50

## 2017-03-29 RX ADMIN — NAFCILLIN 200 GRAM(S): 10 INJECTION, POWDER, FOR SOLUTION INTRAVENOUS at 18:04

## 2017-03-29 RX ADMIN — Medication 100 MILLIGRAM(S): at 11:50

## 2017-03-29 RX ADMIN — Medication 100 MILLIGRAM(S): at 14:12

## 2017-03-29 RX ADMIN — NAFCILLIN 200 GRAM(S): 10 INJECTION, POWDER, FOR SOLUTION INTRAVENOUS at 05:49

## 2017-03-29 RX ADMIN — Medication 100 MILLIGRAM(S): at 22:03

## 2017-03-29 RX ADMIN — SODIUM CHLORIDE 75 MILLILITER(S): 9 INJECTION INTRAMUSCULAR; INTRAVENOUS; SUBCUTANEOUS at 22:21

## 2017-03-29 RX ADMIN — Medication 4 UNIT(S): at 11:49

## 2017-03-29 RX ADMIN — Medication 6: at 18:04

## 2017-03-29 RX ADMIN — Medication 4: at 22:03

## 2017-03-29 RX ADMIN — Medication 4 UNIT(S): at 12:42

## 2017-03-29 RX ADMIN — Medication 7 UNIT(S): at 18:04

## 2017-03-29 RX ADMIN — Medication 100 MILLIGRAM(S): at 05:49

## 2017-03-29 RX ADMIN — Medication 10: at 12:42

## 2017-03-29 RX ADMIN — SODIUM CHLORIDE 2000 MILLILITER(S): 9 INJECTION, SOLUTION INTRAVENOUS at 19:15

## 2017-03-29 RX ADMIN — NAFCILLIN 200 GRAM(S): 10 INJECTION, POWDER, FOR SOLUTION INTRAVENOUS at 11:40

## 2017-03-29 RX ADMIN — NAFCILLIN 200 GRAM(S): 10 INJECTION, POWDER, FOR SOLUTION INTRAVENOUS at 02:27

## 2017-03-29 RX ADMIN — NAFCILLIN 200 GRAM(S): 10 INJECTION, POWDER, FOR SOLUTION INTRAVENOUS at 14:11

## 2017-03-29 NOTE — PROGRESS NOTE ADULT - SUBJECTIVE AND OBJECTIVE BOX
HOSPITALIST PROGRESS NOTE    LYNN MELISSA  [id number]  41yMale    Patient is a 41y old  Male who presents with a chief complaint of fever, chills, cough (22 Mar 2017 20:10)      SUBJECTIVE: Chart reviewed since last visit. Patient seen and examined at bedside earlier for empyema.  Chest tubes removed.  Pain less at VATS site. Denies dyspnea, minimal cough.  No fever or chills      OBJECTIVE:  Vital Signs Last 24 Hrs  T(C): 37.1, Max: 37.3 (03-28 @ 21:30)  T(F): 98.7, Max: 99.2 (03-28 @ 21:30)  HR: 84 (80 - 91)  BP: 118/70 (114/64 - 125/70)  RR: 18 (16 - 18)  SpO2: 98% (96% - 98%)    PHYSICAL EXAMINATION  General: NAD[+]   nontoxic[+]    HEENT: AT/NC[+]  PERRLA[+]    NECK: Supple[+]    CVS: RRR[+]   S1+S2[+]  Dressing Right chest.  RESP: Fair air entry bilaterally[+]except Right lower lung with absent sounds  Crackles[+]RLL.   GI: Soft[+]  Nondistended[+]   Nontender[+]   Bowel Sounds[+]    : suprapubic tenderness[-]     MS: FROM[+]   Edema[-]  CNS: AAOx3[+]     INTEG: Skin is Warm[+]    PSYCH: Fair Mood, Affect    MONITOR:  CAPILLARY BLOOD GLUCOSE  371 (29 Mar 2017 11:33)  113 (29 Mar 2017 08:38)  180 (28 Mar 2017 21:30)  374 (28 Mar 2017 17:30)  248 (28 Mar 2017 12:59)        I&O's Summary  I & Os for 24h ending 29 Mar 2017 07:00  =============================================  IN: 1040 ml / OUT: 1600 ml / NET: -560 ml    I & Os for current day (as of 29 Mar 2017 12:40)  =============================================  IN: 240 ml / OUT: 650 ml / NET: -410 ml                          9.3    4.6   )-----------( 380      ( 28 Mar 2017 05:03 )             27.2       28 Mar 2017 05:03    133    |  98     |  4.0    ----------------------------<  185    4.0     |  27.0   |  0.34     Ca    8.2        28 Mar 2017 05:03  Mg     1.9       28 Mar 2017 05:03          MEDICATIONS  (STANDING):  enoxaparin Injectable 40milliGRAM(s) SubCutaneous every 24 hours  docusate sodium 100milliGRAM(s) Oral three times a day  pyridoxine 100milliGRAM(s) Oral daily  insulin lispro (HumaLOG) corrective regimen sliding scale  SubCutaneous Before meals and at bedtime  insulin glargine Injectable (LANTUS) 12Unit(s) SubCutaneous at bedtime  insulin lispro Injectable (HumaLOG) 4Unit(s) SubCutaneous three times a day before meals  nafcillin  IVPB  IV Intermittent   nafcillin  IVPB 2Gram(s) IV Intermittent every 4 hours      MEDICATIONS  (PRN):  oxyCODONE  5 mG/acetaminophen 325 mG 1Tablet(s) Oral every 6 hours PRN Moderate Pain (4 - 6)

## 2017-03-29 NOTE — PROGRESS NOTE ADULT - SUBJECTIVE AND OBJECTIVE BOX
INTERVAL HPI/OVERNIGHT EVENTS:  Uneventful night, eating well, good appetite, less chest pain. Bgs still high after eating .   BGs am now good 113.     MEDICATIONS  (STANDING):  enoxaparin Injectable 40milliGRAM(s) SubCutaneous every 24 hours  docusate sodium 100milliGRAM(s) Oral three times a day  pyridoxine 100milliGRAM(s) Oral daily  insulin lispro (HumaLOG) corrective regimen sliding scale  SubCutaneous Before meals and at bedtime  insulin glargine Injectable (LANTUS) 12Unit(s) SubCutaneous at bedtime  insulin lispro Injectable (HumaLOG) 4Unit(s) SubCutaneous three times a day before meals  nafcillin  IVPB  IV Intermittent   nafcillin  IVPB 2Gram(s) IV Intermittent every 4 hours    MEDICATIONS  (PRN):  oxyCODONE  5 mG/acetaminophen 325 mG 1Tablet(s) Oral every 6 hours PRN Moderate Pain (4 - 6)      Allergies  No Known Allergies    Review of systems:  some chest pain at the tube insertion site, no SOB, no fever, no nausea, no vomitting, no diarrhea, no diplopia, no dizziness.       Vital Signs Last 24 Hrs  T(C): 36.8, Max: 37.3 (03-28 @ 21:30)  T(F): 98.2, Max: 99.2 (03-28 @ 21:30)  HR: 80 (80 - 91)  BP: 124/74 (114/64 - 125/70)  BP(mean): --  RR: 16 (16 - 18)  SpO2: 96% (96% - 98%)    PHYSICAL EXAM:  Constitutional: NAD, well-groomed, well-developed  HEENT: PERRLA, EOMI, no exophalmos  Neck: No LAD, No JVD, trachea midline, no thyroid enlargement  Back: Normal spine flexure, No CVA tenderness  Respiratory: CTAB  Cardiovascular: S1 and S2, RRR, no M/G/R  Gastrointestinal: BS+, soft, no organomeglag or mass  Extremities: No peripheral edema, no pedal lesions  Vascular: 2+ peripheral pulses  Neurological: A/O x 3, no focal deficits  Psychiatric: Normal mood, normal affect  Musculoskeletal: 5/5 strength b/l upper and lower extremities  Skin: No rashes, no acanthosis        LABS:                        9.3    4.6   )-----------( 380      ( 28 Mar 2017 05:03 )             27.2     28 Mar 2017 05:03    133    |  98     |  4.0    ----------------------------<  185    4.0     |  27.0   |  0.34     Ca    8.2        28 Mar 2017 05:03  Mg     1.9       28 Mar 2017 05:03            CAPILLARY BLOOD GLUCOSE  113 (29 Mar 2017 08:38)  180 (28 Mar 2017 21:30)  374 (28 Mar 2017 17:30)  248 (28 Mar 2017 12:59)  181 (28 Mar 2017 08:00)  282 (27 Mar 2017 20:00)  209 (27 Mar 2017 17:00)  160 (27 Mar 2017 12:00)  162 (27 Mar 2017 08:00)  105 (26 Mar 2017 23:50)  229 (26 Mar 2017 22:00)  119 (26 Mar 2017 17:00)  109 (26 Mar 2017 12:00)  83 (26 Mar 2017 08:00)  167 (25 Mar 2017 22:00)  172 (25 Mar 2017 17:00)  190 (25 Mar 2017 15:30)  223 (25 Mar 2017 12:00)      RADIOLOGY & ADDITIONAL TESTS:

## 2017-03-29 NOTE — PROGRESS NOTE ADULT - ASSESSMENT
Dm newly diagnosed in patient that had decortication for empyema.  Bgs slightly high, probably due to recent infection as well.   Continue  lantus to 12 u HS and increase lispro to 7 u TID with meals.    Continue SSI coverage as needed.

## 2017-03-29 NOTE — PROGRESS NOTE ADULT - ASSESSMENT
41M who originally presented to [facility] for pneumonia and was found to have lung abscesses requiring transfer to [facility] for further management. The patient was continued on intravenous antibiotics for empyema. He was seen by Cardiothoracic Surgery in consultation with recommendation to pursue surgical intervention and possibly decortication. The patient was seen by Infectious Disease in consultation and continued on intravenous antibiotics. CT of the chest noted no evidence of mediastinal or hilar lymphadenopathy or pericardial effusion, noted a loculated right pleural effusion, pleural thickening surrounding the loculated effusion, consolidation of the right lower lobe, cavitation within the consolidated right lower lobe with small fluid pockets within the lung. HIV screen was noted to be negative and the patient was placed in isolation while awaiting further evaluation for possible tuberculosis.  S/p VATS, Decortication right lung (03/24/17), with clinical improvement. Sputum culture with MSSA sans bacteremia. Intra-operative cultures with rare Staph. aureus. Remains afebrile without leukocytosis. Both chest tubes removed from right chest.  Was treated for TB empirically. AFB stain negative, cultures pending, medications discontinued.  DMT2 uncontrolled, A1c >12, improving in-patient hyperglycemia initially with Insulin, however now with hyperglycemia again  At risk for severe Protein Calorie Malnutrition given low albumin, on Supplements  Anemia, Normocytic appears stable.  Hypertension - currently normotensive, no longer having orthostatic hypotension.

## 2017-03-29 NOTE — CHART NOTE - NSCHARTNOTEFT_GEN_A_CORE
Rapid Response PGY 2/ PGY 3 Note    [id number]  LYNN MELISSA    Clayton Estrellase was called on a 41y year old Male patient for hypotension, sweating and dizziness__  Patient has a past medical history of PMHX of DM and empyema    Patient was seen and examined at the bedside by the rapid response team.  RN called patient blood pressure 80/50's HR 80-90s and had some dizziness.  He states no CP or SOB.  He was sitting in the chair eating dinner and suddenly developed dizziness.   He describes the dizziness as room spinning for few seconds.  Upon arrival to bedside patient now symptoms have resolved and is feeling better.     Allergies    No Known Allergies    Intolerances        PAST MEDICAL & SURGICAL HISTORY:  Diabetes  No significant past surgical history      Vital Signs Last 24 Hrs  T(C): 37.6, Max: 37.6 (03-29 @ 16:00)  T(F): 99.7, Max: 99.7 (03-29 @ 16:45)  HR: 70 (70 - 91)  BP: 102/70 (102/70 - 124/74)  BP(mean): --  RR: 18 (16 - 18)  SpO2: 96% (96% - 98%)          PHYSICAL EXAM:    GENERAL: NAD, well-groomed, well-developed, not in acute distress no respiratory  HEAD:  Atraumatic, Normocephalic  EYES: EOMI, PERRLA, conjunctiva and sclera clear  NERVOUS SYSTEM:  Alert & Oriented X3, Good concentration; Motor Strength 5/5 B/L upper and lower extremities; DTRs 2+ intact and symmetric  CHEST/LUNG: Clear to percussion bilaterally; mild crakles RLL, otherwise good air entry No rales, rhonchi, wheezing, or rubs  HEART: Regular rate and rhythm; No murmurs, rubs, or gallops  ABDOMEN: Soft, Nontender, Nondistended; Bowel sounds present  EXTREMITIES:  2+ Peripheral Pulses, No clubbing, cyanosis, or edema      I & Os for 24h ending 03-29 @ 07:00  =============================================  IN: 1040 ml / OUT: 1600 ml / NET: -560 ml    I & Os for current day (as of 03-29 @ 19:17)  =============================================  IN: 1260 ml / OUT: 650 ml / NET: 610 ml                            9.3    4.6   )-----------( 380      ( 28 Mar 2017 05:03 )             27.2     28 Mar 2017 05:03    133    |  98     |  4.0    ----------------------------<  185    4.0     |  27.0   |  0.34     Ca    8.2        28 Mar 2017 05:03  Mg     1.9       28 Mar 2017 05:03           Assessment- Rapid Response called for 41y year old Male with a past medical history of emphysema and new onset DM    Plan-  bedside 's  IVF 1 liter bolus given BP now improved prior to bolus BP 96/50's HR 90's and now asymptomatic  senior resident at bedside will discuss the above w hospitalist on call.  c/w abx for emphysema- no SOB or CP, saturation >96% no sepsis found at this time, afebrile temp 98F  no pain meds were given or BP meds given prior to rapid response being called  No events were found on Telemetry montior Rapid Response PGY 2/ PGY 3 Note Antonio PGY 2/  PGY 3    [id number]  LYNN Huff was called on a 41y year old Male patient for hypotension, sweating and dizziness__  Patient has a past medical history of PMHX of DM and empyema    Patient was seen and examined at the bedside by the rapid response team.  RN called patient blood pressure 80/50's HR 80-90s and had some dizziness.  He states no CP or SOB.  He was sitting in the chair eating dinner and suddenly developed dizziness.   He describes the dizziness as room spinning for few seconds.  Upon arrival to bedside patient now symptoms have resolved and is feeling better.     Allergies    No Known Allergies    Intolerances        PAST MEDICAL & SURGICAL HISTORY:  Diabetes  No significant past surgical history      Vital Signs Last 24 Hrs  T(C): 37.6, Max: 37.6 (03-29 @ 16:00)  T(F): 99.7, Max: 99.7 (03-29 @ 16:45)  HR: 70 (70 - 91)  BP: 102/70 (102/70 - 124/74)  BP(mean): --  RR: 18 (16 - 18)  SpO2: 96% (96% - 98%)          PHYSICAL EXAM:    GENERAL: NAD, well-groomed, well-developed, not in acute distress no respiratory  HEAD:  Atraumatic, Normocephalic  EYES: EOMI, PERRLA, conjunctiva and sclera clear  NERVOUS SYSTEM:  Alert & Oriented X3, Good concentration; Motor Strength 5/5 B/L upper and lower extremities; DTRs 2+ intact and symmetric  CHEST/LUNG: Clear to percussion bilaterally; mild crakles RLL, otherwise good air entry No rales, rhonchi, wheezing, or rubs  HEART: Regular rate and rhythm; No murmurs, rubs, or gallops  ABDOMEN: Soft, Nontender, Nondistended; Bowel sounds present  EXTREMITIES:  2+ Peripheral Pulses, No clubbing, cyanosis, or edema      I & Os for 24h ending 03-29 @ 07:00  =============================================  IN: 1040 ml / OUT: 1600 ml / NET: -560 ml    I & Os for current day (as of 03-29 @ 19:17)  =============================================  IN: 1260 ml / OUT: 650 ml / NET: 610 ml                            9.3    4.6   )-----------( 380      ( 28 Mar 2017 05:03 )             27.2     28 Mar 2017 05:03    133    |  98     |  4.0    ----------------------------<  185    4.0     |  27.0   |  0.34     Ca    8.2        28 Mar 2017 05:03  Mg     1.9       28 Mar 2017 05:03           Assessment- Rapid Response called for 41y year old Male with a past medical history of emphysema and new onset DM    Plan-  bedside 's  IVF 1 liter bolus given BP now improved prior to bolus BP 96/50's HR 90's and now asymptomatic  senior resident at bedside will discuss the above w hospitalist on call.  c/w abx for emphysema- no SOB or CP, saturation >96% no sepsis found at this time, afebrile temp 98F  no pain meds were given or BP meds given prior to rapid response being called  No events were found on Telemetry montior Rapid Response PGY 2/ PGY 3 Note Antonio PGY 2/  PGY 3    [id number]  LYNN Huff was called on a 41y year old Male patient for hypotension, sweating and dizziness__  Patient has a past medical history of PMHX of DM and empyema    Patient was seen and examined at the bedside by the rapid response team.  RN called patient blood pressure 80/50's HR 80-90s and had some dizziness.  He states no CP or SOB.  He was sitting in the chair eating dinner and suddenly developed dizziness.   He describes the dizziness as room spinning for few seconds.  Upon arrival to bedside patient now symptoms have resolved and is feeling better.     Allergies    No Known Allergies    Intolerances        PAST MEDICAL & SURGICAL HISTORY:  Diabetes  No significant past surgical history      Vital Signs Last 24 Hrs  T(C): 37.6, Max: 37.6 (03-29 @ 16:00)  T(F): 99.7, Max: 99.7 (03-29 @ 16:45)  HR: 70 (70 - 91)  BP: 102/70 (102/70 - 124/74)  BP(mean): --  RR: 18 (16 - 18)  SpO2: 96% (96% - 98%)          PHYSICAL EXAM:    GENERAL: NAD, well-groomed, well-developed, not in acute distress no respiratory  HEAD:  Atraumatic, Normocephalic  EYES: EOMI, PERRLA, conjunctiva and sclera clear  NERVOUS SYSTEM:  Alert & Oriented X3, Good concentration; Motor Strength 5/5 B/L upper and lower extremities; DTRs 2+ intact and symmetric  CHEST/LUNG: Clear to percussion bilaterally; mild crakles RLL, otherwise good air entry No rales, rhonchi, wheezing, or rubs  HEART: Regular rate and rhythm; No murmurs, rubs, or gallops  ABDOMEN: Soft, Nontender, Nondistended; Bowel sounds present  EXTREMITIES:  2+ Peripheral Pulses, No clubbing, cyanosis, or edema      I & Os for 24h ending 03-29 @ 07:00  =============================================  IN: 1040 ml / OUT: 1600 ml / NET: -560 ml    I & Os for current day (as of 03-29 @ 19:17)  =============================================  IN: 1260 ml / OUT: 650 ml / NET: 610 ml                            9.3    4.6   )-----------( 380      ( 28 Mar 2017 05:03 )             27.2     28 Mar 2017 05:03    133    |  98     |  4.0    ----------------------------<  185    4.0     |  27.0   |  0.34     Ca    8.2        28 Mar 2017 05:03  Mg     1.9       28 Mar 2017 05:03           Assessment- Rapid Response called for 41y year old Male with a past medical history of emphysema and new onset DM    Plan-  bedside 's  IVF 1 liter bolus given BP now improved prior to bolus BP 96/50's HR 90's and now asymptomatic  senior resident at bedside will discuss the above w hospitalist on call.  c/w abx for emphysema- no SOB or CP, saturation >96% no sepsis found at this time, afebrile temp 98F  no pain meds were given or BP meds given prior to rapid response being called  No events were found on Telemetry montior  Orthostatic BP ordered now stat

## 2017-03-30 LAB
ANION GAP SERPL CALC-SCNC: 11 MMOL/L — SIGNIFICANT CHANGE UP (ref 5–17)
BUN SERPL-MCNC: 8 MG/DL — SIGNIFICANT CHANGE UP (ref 8–20)
CALCIUM SERPL-MCNC: 8.3 MG/DL — LOW (ref 8.6–10.2)
CHLORIDE SERPL-SCNC: 97 MMOL/L — LOW (ref 98–107)
CO2 SERPL-SCNC: 25 MMOL/L — SIGNIFICANT CHANGE UP (ref 22–29)
CREAT SERPL-MCNC: 0.4 MG/DL — LOW (ref 0.5–1.3)
CULTURE RESULTS: SIGNIFICANT CHANGE UP
CULTURE RESULTS: SIGNIFICANT CHANGE UP
GLUCOSE SERPL-MCNC: 241 MG/DL — HIGH (ref 70–115)
HCT VFR BLD CALC: 28.9 % — LOW (ref 42–52)
HGB BLD-MCNC: 9.8 G/DL — LOW (ref 14–18)
MCHC RBC-ENTMCNC: 28.7 PG — SIGNIFICANT CHANGE UP (ref 27–31)
MCHC RBC-ENTMCNC: 33.9 G/DL — SIGNIFICANT CHANGE UP (ref 32–36)
MCV RBC AUTO: 84.5 FL — SIGNIFICANT CHANGE UP (ref 80–94)
ORGANISM # SPEC MICROSCOPIC CNT: SIGNIFICANT CHANGE UP
ORGANISM # SPEC MICROSCOPIC CNT: SIGNIFICANT CHANGE UP
PLATELET # BLD AUTO: 364 K/UL — SIGNIFICANT CHANGE UP (ref 150–400)
POTASSIUM SERPL-MCNC: 3.5 MMOL/L — SIGNIFICANT CHANGE UP (ref 3.5–5.3)
POTASSIUM SERPL-SCNC: 3.5 MMOL/L — SIGNIFICANT CHANGE UP (ref 3.5–5.3)
RBC # BLD: 3.42 M/UL — LOW (ref 4.6–6.2)
RBC # FLD: 13.3 % — SIGNIFICANT CHANGE UP (ref 11–15.6)
SODIUM SERPL-SCNC: 133 MMOL/L — LOW (ref 135–145)
SPECIMEN SOURCE: SIGNIFICANT CHANGE UP
SPECIMEN SOURCE: SIGNIFICANT CHANGE UP
WBC # BLD: 6 K/UL — SIGNIFICANT CHANGE UP (ref 4.8–10.8)
WBC # FLD AUTO: 6 K/UL — SIGNIFICANT CHANGE UP (ref 4.8–10.8)

## 2017-03-30 PROCEDURE — 99233 SBSQ HOSP IP/OBS HIGH 50: CPT

## 2017-03-30 RX ORDER — NAFCILLIN 10 G/100ML
2 INJECTION, POWDER, FOR SOLUTION INTRAVENOUS
Qty: 40 | Refills: 0 | OUTPATIENT
Start: 2017-03-30 | End: 2017-04-21

## 2017-03-30 RX ORDER — POTASSIUM CHLORIDE 20 MEQ
40 PACKET (EA) ORAL ONCE
Qty: 0 | Refills: 0 | Status: COMPLETED | OUTPATIENT
Start: 2017-03-30 | End: 2017-03-30

## 2017-03-30 RX ADMIN — Medication 7 UNIT(S): at 12:24

## 2017-03-30 RX ADMIN — Medication 40 MILLIEQUIVALENT(S): at 13:51

## 2017-03-30 RX ADMIN — Medication 7 UNIT(S): at 17:32

## 2017-03-30 RX ADMIN — NAFCILLIN 200 GRAM(S): 10 INJECTION, POWDER, FOR SOLUTION INTRAVENOUS at 05:44

## 2017-03-30 RX ADMIN — NAFCILLIN 200 GRAM(S): 10 INJECTION, POWDER, FOR SOLUTION INTRAVENOUS at 02:15

## 2017-03-30 RX ADMIN — NAFCILLIN 200 GRAM(S): 10 INJECTION, POWDER, FOR SOLUTION INTRAVENOUS at 09:19

## 2017-03-30 RX ADMIN — NAFCILLIN 200 GRAM(S): 10 INJECTION, POWDER, FOR SOLUTION INTRAVENOUS at 22:25

## 2017-03-30 RX ADMIN — INSULIN GLARGINE 12 UNIT(S): 100 INJECTION, SOLUTION SUBCUTANEOUS at 22:24

## 2017-03-30 RX ADMIN — Medication 4: at 09:19

## 2017-03-30 RX ADMIN — ENOXAPARIN SODIUM 40 MILLIGRAM(S): 100 INJECTION SUBCUTANEOUS at 12:24

## 2017-03-30 RX ADMIN — Medication 100 MILLIGRAM(S): at 05:44

## 2017-03-30 RX ADMIN — NAFCILLIN 200 GRAM(S): 10 INJECTION, POWDER, FOR SOLUTION INTRAVENOUS at 17:28

## 2017-03-30 RX ADMIN — Medication 2: at 12:24

## 2017-03-30 RX ADMIN — Medication 7 UNIT(S): at 09:19

## 2017-03-30 RX ADMIN — Medication 100 MILLIGRAM(S): at 12:24

## 2017-03-30 RX ADMIN — NAFCILLIN 200 GRAM(S): 10 INJECTION, POWDER, FOR SOLUTION INTRAVENOUS at 13:51

## 2017-03-30 RX ADMIN — Medication 6: at 22:24

## 2017-03-30 RX ADMIN — Medication 100 MILLIGRAM(S): at 22:24

## 2017-03-30 RX ADMIN — Medication 6: at 17:33

## 2017-03-30 RX ADMIN — Medication 100 MILLIGRAM(S): at 13:51

## 2017-03-30 NOTE — PROGRESS NOTE ADULT - SUBJECTIVE AND OBJECTIVE BOX
HOSPITALIST PROGRESS NOTE    LYNN MELISSA  [id number]  41yMale     is a 41y old male recovering from empyema. He can receive a PICC line and be d/cd with 4 more weeks of IV abx. His picc line has been ordered for today.     SUBJECTIVE: Chart reviewed since last visit. Patient seen and examined at bedside earlier for empyema.  Chest tubes removed.  Pain less at VATS site. Denies dyspnea, minimal cough.  No fever or chills    HR=80, 100/60    PHYSICAL EXAMINATION  General: NAD[+]   nontoxic[+]    HEENT: AT/NC[+]  PERRLA[+]    NECK: Supple[+]    CVS: RRR[+]   S1+S2[+]  Dressing Right chest.  RESP: Fair air entry bilaterally[+]except Right lower lung with absent sounds  Crackles[+]RLL.   GI: Soft[+]  Nondistended[+]   Nontender[+]   Bowel Sounds[+]    : suprapubic tenderness[-]     MS: FROM[+]   Edema[-]  CNS: AAOx3[+]     INTEG: Skin is Warm[+]    PSYCH: Fair Mood, Affect    MONITOR:  CAPILLARY BLOOD GLUCOSE  371 (29 Mar 2017 11:33)  113 (29 Mar 2017 08:38)  180 (28 Mar 2017 21:30)  374 (28 Mar 2017 17:30)  248 (28 Mar 2017 12:59)    I&O's Summary  I & Os for 24h ending 29 Mar 2017 07:00  =============================================  IN: 1040 ml / OUT: 1600 ml / NET: -560 ml    I & Os for current day (as of 29 Mar 2017 12:40)  =============================================  IN: 240 ml / OUT: 650 ml / NET: -410 ml                          9.3    4.6   )-----------( 380      ( 28 Mar 2017 05:03 )             27.2       28 Mar 2017 05:03    133    |  98     |  4.0    ----------------------------<  185    4.0     |  27.0   |  0.34     Ca    8.2        28 Mar 2017 05:03  Mg     1.9       28 Mar 2017 05:03      MEDICATIONS  (STANDING):  enoxaparin Injectable 40milliGRAM(s) SubCutaneous every 24 hours  docusate sodium 100milliGRAM(s) Oral three times a day  pyridoxine 100milliGRAM(s) Oral daily  insulin lispro (HumaLOG) corrective regimen sliding scale  SubCutaneous Before meals and at bedtime  insulin glargine Injectable (LANTUS) 12Unit(s) SubCutaneous at bedtime  insulin lispro Injectable (HumaLOG) 4Unit(s) SubCutaneous three times a day before meals  nafcillin  IVPB  IV Intermittent   nafcillin  IVPB 2Gram(s) IV Intermittent every 4 hours      MEDICATIONS  (PRN):  oxyCODONE  5 mG/acetaminophen 325 mG 1Tablet(s) Oral every 6 hours PRN Moderate Pain (4 - 6)

## 2017-03-30 NOTE — PROGRESS NOTE ADULT - ASSESSMENT
41M who originally presented to [facility] for pneumonia and was found to have lung abscesses requiring transfer to [facility] for further management. The patient was continued on intravenous antibiotics for empyema. He was seen by Cardiothoracic Surgery in consultation with recommendation to pursue surgical intervention and possibly decortication. The patient was seen by Infectious Disease in consultation and continued on intravenous antibiotics. CT of the chest noted no evidence of mediastinal or hilar lymphadenopathy or pericardial effusion, noted a loculated right pleural effusion, pleural thickening surrounding the loculated effusion, consolidation of the right lower lobe, cavitation within the consolidated right lower lobe with small fluid pockets within the lung. HIV screen was noted to be negative and the patient was placed in isolation while awaiting further evaluation for possible tuberculosis.  S/p VATS, Decortication right lung (03/24/17), with clinical improvement. Sputum culture with MSSA sans bacteremia. Intra-operative cultures with rare Staph. aureus. Remains afebrile without leukocytosis. Both chest tubes removed from right chest.  Was treated for TB empirically. AFB stain negative, cultures pending, medications discontinued.  DMT2 uncontrolled, A1c >12, improving in-patient hyperglycemia initially with Insulin, however now with hyperglycemia again  At risk for severe Protein Calorie Malnutrition given low albumin, on Supplements  Anemia, Normocytic appears stable.  Hypertension - currently normotensive, no longer having orthostatic hypotension.

## 2017-03-30 NOTE — PROGRESS NOTE ADULT - SUBJECTIVE AND OBJECTIVE BOX
[facility]  INFECTIOUS DISEASES AND INTERNAL MEDICINE OF [place]  =======================================================  Papito Castorena MD  Diplomates American Board of Internal Medicine and Infectious Diseases  =======================================================    LYNN MELISSA     Follow up for MSSA empyema    No complaints  Feels well    Allergies:  No Known Allergies      Antibiotics:  nafcillin  IVPB 2Gram(s) IV Intermittent every 4 hours       REVIEW OF SYSTEMS:  CONSTITUTIONAL:  No fevers or chills  HEENT: No diplopia or blurred vision. No earache, sore throat or runny nose.  CARDIOVASCULAR:  No pressure, squeezing, strangling, tightness, heaviness or aching about the chest, neck, axilla or epigastrium.  RESPIRATORY:  No cough, shortness of breath  GASTROINTESTINAL:  No nausea, vomiting or diarrhea.  GENITOURINARY:  No dysuria, frequency or urgency.   MUSCULOSKELETAL:  no joint aches, no muscle pain  SKIN:  No change in skin, hair or nails.  NEUROLOGIC:  No paresthesias, fasciculations, seizures or weakness.  PSYCHIATRIC:  No disorder of thought or mood.  ENDOCRINE:  No heat or cold intolerance, polyuria or polydipsia.  HEMATOLOGICAL:  No easy bruising or bleeding.       Physical Exam:  Vital Signs Last 24 Hrs  T(C): 36.9, Max: 37.6 (03-29 @ 16:00)  T(F): 98.4, Max: 99.7 (03-29 @ 16:45)  HR: 81 (70 - 98)  BP: 102/60 (84/53 - 162/100)  RR: 16 (16 - 20)  SpO2: 97% (96% - 98%)    GEN: NAD, pleasant  HEENT: normocephalic and atraumatic. EOMI. PERRL.    NECK: Supple.  LUNGS: Clear to auscultation.   HEART: Regular rate and rhythm   ABDOMEN: Soft, nontender, and nondistended.  Positive bowel sounds.    : No CVA tenderness  EXTREMITIES: Without any edema.  MSK; No Joint effusion  NEUROLOGIC: Cranial nerves II through XII are grossly intact.  PSYCHIATRIC: Appropriate affect .  SKIN: No ulceration or induration present.        Labs:  30 Mar 2017 05:40    133    |  97     |  8.0    ----------------------------<  241    3.5     |  25.0   |  0.40     Ca    8.3        30 Mar 2017 05:40                            9.8    6.0   )-----------( 364      ( 30 Mar 2017 05:40 )             28.9       RECENT CULTURES:  03-25 .Body Fluid right pleural effusion XXXX XXXX XXXX    03-24 .Body Fluid right pleural effusion Staphylococcus aureus   Few White blood cells  No organisms seen   Rare Staphylococcus aureus    03-24 .Tissue contents of decortication XXXX   Few White blood cells  No organisms seen   No growth at 4 days.  Culture in progress    03-23 .Sputum Sputum Staphylococcus aureus   Few White blood cells  No organisms seen   Rare Staphylococcus aureus  Moderate Candida species  Numerous Routine respiratory april present    03-23 .Blood Blood XXXX XXXX   No growth at 48 hours    03-23 .Blood Blood XXXX XXXX   No growth at 48 hours    03-23 .Urine Clean Catch (Midstream) XXXX XXXX   No growth

## 2017-03-31 ENCOUNTER — TRANSCRIPTION ENCOUNTER (OUTPATIENT)
Age: 42
End: 2017-03-31

## 2017-03-31 PROCEDURE — 71010: CPT | Mod: 26

## 2017-03-31 PROCEDURE — 99239 HOSP IP/OBS DSCHRG MGMT >30: CPT

## 2017-03-31 RX ORDER — FOLIC ACID 0.8 MG
1 TABLET ORAL
Qty: 0 | Refills: 0 | COMMUNITY
Start: 2017-03-31

## 2017-03-31 RX ORDER — DOCUSATE SODIUM 100 MG
1 CAPSULE ORAL
Qty: 0 | Refills: 0 | COMMUNITY
Start: 2017-03-31

## 2017-03-31 RX ORDER — INSULIN LISPRO 100/ML
7 VIAL (ML) SUBCUTANEOUS
Qty: 0 | Refills: 0 | Status: DISCONTINUED | OUTPATIENT
Start: 2017-03-31 | End: 2017-04-01

## 2017-03-31 RX ORDER — INSULIN GLARGINE 100 [IU]/ML
15 INJECTION, SOLUTION SUBCUTANEOUS AT BEDTIME
Qty: 0 | Refills: 0 | Status: DISCONTINUED | OUTPATIENT
Start: 2017-03-31 | End: 2017-04-01

## 2017-03-31 RX ORDER — MULTIVIT-MIN/FERROUS GLUCONATE 9 MG/15 ML
1 LIQUID (ML) ORAL DAILY
Qty: 0 | Refills: 0 | Status: DISCONTINUED | OUTPATIENT
Start: 2017-03-31 | End: 2017-04-01

## 2017-03-31 RX ORDER — FOLIC ACID 0.8 MG
1 TABLET ORAL DAILY
Qty: 0 | Refills: 0 | Status: DISCONTINUED | OUTPATIENT
Start: 2017-03-31 | End: 2017-04-01

## 2017-03-31 RX ORDER — FERROUS SULFATE 325(65) MG
1 TABLET ORAL
Qty: 60 | Refills: 0 | OUTPATIENT
Start: 2017-03-31 | End: 2017-04-30

## 2017-03-31 RX ORDER — FERROUS SULFATE 325(65) MG
325 TABLET ORAL DAILY
Qty: 0 | Refills: 0 | Status: DISCONTINUED | OUTPATIENT
Start: 2017-03-31 | End: 2017-04-01

## 2017-03-31 RX ORDER — MULTIVIT-MIN/FERROUS GLUCONATE 9 MG/15 ML
1 LIQUID (ML) ORAL
Qty: 0 | Refills: 0 | COMMUNITY
Start: 2017-03-31

## 2017-03-31 RX ADMIN — NAFCILLIN 200 GRAM(S): 10 INJECTION, POWDER, FOR SOLUTION INTRAVENOUS at 05:06

## 2017-03-31 RX ADMIN — Medication 1 TABLET(S): at 14:20

## 2017-03-31 RX ADMIN — Medication 325 MILLIGRAM(S): at 12:37

## 2017-03-31 RX ADMIN — NAFCILLIN 200 GRAM(S): 10 INJECTION, POWDER, FOR SOLUTION INTRAVENOUS at 18:47

## 2017-03-31 RX ADMIN — NAFCILLIN 200 GRAM(S): 10 INJECTION, POWDER, FOR SOLUTION INTRAVENOUS at 02:11

## 2017-03-31 RX ADMIN — Medication 10: at 12:38

## 2017-03-31 RX ADMIN — Medication 7 UNIT(S): at 18:47

## 2017-03-31 RX ADMIN — Medication 2: at 08:54

## 2017-03-31 RX ADMIN — Medication 7 UNIT(S): at 12:38

## 2017-03-31 RX ADMIN — Medication 4: at 18:48

## 2017-03-31 RX ADMIN — Medication 100 MILLIGRAM(S): at 12:37

## 2017-03-31 RX ADMIN — Medication 7 UNIT(S): at 08:56

## 2017-03-31 RX ADMIN — NAFCILLIN 200 GRAM(S): 10 INJECTION, POWDER, FOR SOLUTION INTRAVENOUS at 12:42

## 2017-03-31 RX ADMIN — Medication 2: at 21:30

## 2017-03-31 RX ADMIN — ENOXAPARIN SODIUM 40 MILLIGRAM(S): 100 INJECTION SUBCUTANEOUS at 21:30

## 2017-03-31 RX ADMIN — INSULIN GLARGINE 15 UNIT(S): 100 INJECTION, SOLUTION SUBCUTANEOUS at 21:30

## 2017-03-31 RX ADMIN — Medication 100 MILLIGRAM(S): at 06:40

## 2017-03-31 RX ADMIN — Medication 1 MILLIGRAM(S): at 12:37

## 2017-03-31 RX ADMIN — NAFCILLIN 200 GRAM(S): 10 INJECTION, POWDER, FOR SOLUTION INTRAVENOUS at 14:19

## 2017-03-31 RX ADMIN — NAFCILLIN 200 GRAM(S): 10 INJECTION, POWDER, FOR SOLUTION INTRAVENOUS at 21:30

## 2017-03-31 NOTE — PROGRESS NOTE ADULT - ASSESSMENT
41M who originally presented to [facility] for pneumonia and was found to have lung abscesses requiring transfer to [facility] for further management. The patient was continued on intravenous antibiotics for empyema. He was seen by Cardiothoracic Surgery in consultation with recommendation to pursue surgical intervention and possibly decortication. The patient was seen by Infectious Disease in consultation and continued on intravenous antibiotics. CT of the chest noted no evidence of mediastinal or hilar lymphadenopathy or pericardial effusion, noted a loculated right pleural effusion, pleural thickening surrounding the loculated effusion, consolidation of the right lower lobe, cavitation within the consolidated right lower lobe with small fluid pockets within the lung. HIV screen was noted to be negative and the patient was placed in isolation while awaiting further evaluation for possible tuberculosis.  S/p VATS, Decortication right lung (03/24/17), with clinical improvement. Sputum culture with MSSA sans bacteremia. Intra-operative cultures with rare Staph. aureus. Remains afebrile without leukocytosis. Both chest tubes removed from right chest.  Was treated for TB empirically. AFB stain negative, cultures pending, medications discontinued.  DMT2 uncontrolled, A1c >12, improving in-patient hyperglycemia initially with Insulin, however now with hyperglycemia again  At risk for severe Protein Calorie Malnutrition given low albumin, on Supplements  Anemia, Normocytic appears stable.  Hypertension - currently normotensive, no longer having orthostatic hypotension.

## 2017-03-31 NOTE — PROCEDURE NOTE - NSPROCDETAILS_GEN_ALL_CORE
sterile dressing applied/sterile technique, catheter placed/supine position/ultrasound guidance/ultrasound assessment/location identified, draped/prepped, sterile technique used

## 2017-03-31 NOTE — DISCHARGE NOTE ADULT - PATIENT PORTAL LINK FT
“You can access the Follow[employer] Patient Portal, offered by [facility], by registering with the following website: [email or web address]”

## 2017-03-31 NOTE — DISCHARGE NOTE ADULT - CARE PROVIDER_API CALL
Pauline Castorena), Infectious Disease; Internal Medicine  [address] [place] [address]  Phone: [phone]  Fax: [phone]    Ashok Cervantes), Thoracic Surgery  [address] [place] [address]  Phone: [phone]  Fax: [phone]    May Pedersen), EndocrinologyMetabDiabetes; Internal Medicine  [address]  [place] [address]  Phone: [phone]  Fax: [phone]    [facility],   Phone: (   )    -  Fax: (   )    -

## 2017-03-31 NOTE — DISCHARGE NOTE ADULT - SECONDARY DIAGNOSIS.
Empyema lung Type 2 diabetes mellitus with other specified complication, without long-term current use of insulin Orthostatic hypotension Protein calorie malnutrition

## 2017-03-31 NOTE — DISCHARGE NOTE ADULT - CARE PROVIDERS DIRECT ADDRESSES
,DirectAddress_Unknown,ana rosaana [email or web address],DirectAddress_Unknown,DirectAddress_Unknown,DirectAddress_Unknown

## 2017-03-31 NOTE — PROGRESS NOTE ADULT - SUBJECTIVE AND OBJECTIVE BOX
HOSPITALIST PROGRESS NOTE    LYNN MELISSA  [id number]  41yMale    Patient is a 41y old  Male who presents with a chief complaint of fever, chills, cough (31 Mar 2017 16:15)      SUBJECTIVE: Chart reviewed since last visit. Patient seen and examined at bedside for empyema.  Feels good  Denies dizziness, fever chill.  Minimal chest pain at VATS site, cough.      OBJECTIVE:  Vital Signs Last 24 Hrs  T(C): 37.1, Max: 37.1 (03-31 @ 12:29)  T(F): 98.8, Max: 98.8 (03-31 @ 12:33)  HR: 80 (79 - 83)  BP: 112/60 (110/60 - 120/70)  BP(mean): --  RR: 16 (16 - 20)  SpO2: 98% (96% - 98%)    PHYSICAL EXAMINATION  General: NAD[+]   nontoxic[+]    HEENT: AT/NC[+]  PERRLA[+]    NECK: Supple[+]    CVS: RRR[+]   S1+S2[+]  Dressing Right chest.  RESP: Fair air entry bilaterally[+]except Right lower lung with absent sounds  Crackles[+]RLL.   GI: Soft[+]  Nondistended[+]   Nontender[+]   Bowel Sounds[+]    : suprapubic tenderness[-]     MS: FROM[+]   Edema[-]  CNS: AAOx3[+]     INTEG: Skin is Warm[+]    PSYCH: Fair Mood, Affect      CAPILLARY BLOOD GLUCOSE  361 (31 Mar 2017 12:33)  182 (31 Mar 2017 08:53)  257 (30 Mar 2017 22:00)        I&O's Summary  I & Os for 24h ending 31 Mar 2017 07:00  =============================================  IN: 1770 ml / OUT: 0 ml / NET: 1770 ml    I & Os for current day (as of 31 Mar 2017 18:44)  =============================================  IN: 580 ml / OUT: 700 ml / NET: -120 ml                          9.8    6.0   )-----------( 364      ( 30 Mar 2017 05:40 )             28.9       30 Mar 2017 05:40    133    |  97     |  8.0    ----------------------------<  241    3.5     |  25.0   |  0.40     Ca    8.3        30 Mar 2017 05:40          MEDICATIONS  (STANDING):  enoxaparin Injectable 40milliGRAM(s) SubCutaneous every 24 hours  docusate sodium 100milliGRAM(s) Oral three times a day  pyridoxine 100milliGRAM(s) Oral daily  insulin lispro (HumaLOG) corrective regimen sliding scale  SubCutaneous Before meals and at bedtime  nafcillin  IVPB  IV Intermittent   nafcillin  IVPB 2Gram(s) IV Intermittent every 4 hours  sodium chloride 0.9%. 1000milliLiter(s) IV Continuous <Continuous>  ferrous    sulfate 325milliGRAM(s) Oral daily  folic acid 1milliGRAM(s) Oral daily  multivitamin/minerals 1Tablet(s) Oral daily  insulin lispro Injectable (HumaLOG) 7Unit(s) SubCutaneous three times a day before meals  insulin glargine Injectable (LANTUS) 15Unit(s) SubCutaneous at bedtime      MEDICATIONS  (PRN):  oxyCODONE  5 mG/acetaminophen 325 mG 1Tablet(s) Oral every 6 hours PRN Moderate Pain (4 - 6)

## 2017-03-31 NOTE — PROGRESS NOTE ADULT - SUBJECTIVE AND OBJECTIVE BOX
INTERVAL HPI/OVERNIGHT EVENTS:  Uneventful night . Bgs in better range    MEDICATIONS  (STANDING):  enoxaparin Injectable 40milliGRAM(s) SubCutaneous every 24 hours  docusate sodium 100milliGRAM(s) Oral three times a day  pyridoxine 100milliGRAM(s) Oral daily  insulin lispro (HumaLOG) corrective regimen sliding scale  SubCutaneous Before meals and at bedtime  nafcillin  IVPB  IV Intermittent   nafcillin  IVPB 2Gram(s) IV Intermittent every 4 hours  sodium chloride 0.9%. 1000milliLiter(s) IV Continuous <Continuous>  ferrous    sulfate 325milliGRAM(s) Oral daily  folic acid 1milliGRAM(s) Oral daily  multivitamin/minerals 1Tablet(s) Oral daily  insulin lispro Injectable (HumaLOG) 7Unit(s) SubCutaneous three times a day before meals  insulin glargine Injectable (LANTUS) 15Unit(s) SubCutaneous at bedtime    MEDICATIONS  (PRN):  oxyCODONE  5 mG/acetaminophen 325 mG 1Tablet(s) Oral every 6 hours PRN Moderate Pain (4 - 6)    Allergies  No Known Allergies    Review of systems:  some chest pain at the tube insertion site, no SOB, no fever, no nausea, no vomitting, no diarrhea, no diplopia, no dizziness.     Vital Signs Last 24 Hrs  T(C): 37, Max: 37 (03-30 @ 22:00)  T(F): 98.6, Max: 98.6 (03-30 @ 22:00)  HR: 82 (70 - 83)  BP: 114/68 (98/50 - 120/70)  BP(mean): --  RR: 18 (18 - 20)  SpO2: 97% (96% - 100%)    PHYSICAL EXAM:    Constitutional: NAD, well-groomed, well-developed  HEENT: PERRLA, EOMI, no exophalmos  Neck: No LAD, No JVD, trachea midline, no thyroid enlargement  Back: Normal spine flexure, No CVA tenderness. chest tube in place.  Respiratory: CTAB  Cardiovascular: S1 and S2, RRR, no M/G/R  Gastrointestinal: BS+, soft, no organomegaly  or mass  Extremities: No peripheral edema, no pedal lesions  Vascular: 2+ peripheral pulses  Neurological: A/O x 3, no focal deficits  Psychiatric: Normal mood, normal affect  Musculoskeletal: 5/5 strength b/l upper and lower extremities  Skin: No rashes    LABS:                        9.8    6.0   )-----------( 364      ( 30 Mar 2017 05:40 )             28.9     30 Mar 2017 05:40    133    |  97     |  8.0    ----------------------------<  241    3.5     |  25.0   |  0.40     Ca    8.3        30 Mar 2017 05:40            CAPILLARY BLOOD GLUCOSE  182 (31 Mar 2017 08:53)  257 (30 Mar 2017 22:00)  264 (30 Mar 2017 16:48)  171 (30 Mar 2017 12:30)  239 (30 Mar 2017 08:31)  220 (29 Mar 2017 22:40)  305 (29 Mar 2017 19:00)  260 (29 Mar 2017 16:45)  371 (29 Mar 2017 11:33)  113 (29 Mar 2017 08:38)  180 (28 Mar 2017 21:30)  374 (28 Mar 2017 17:30)  248 (28 Mar 2017 12:59)  181 (28 Mar 2017 08:00)  282 (27 Mar 2017 20:00)  209 (27 Mar 2017 17:00)  160 (27 Mar 2017 12:00)      RADIOLOGY & ADDITIONAL TESTS: INTERVAL HPI/OVERNIGHT EVENTS:  Uneventful night . Bgs in better range    MEDICATIONS  (STANDING):  enoxaparin Injectable 40milliGRAM(s) SubCutaneous every 24 hours  docusate sodium 100milliGRAM(s) Oral three times a day  pyridoxine 100milliGRAM(s) Oral daily  insulin lispro (HumaLOG) corrective regimen sliding scale  SubCutaneous Before meals and at bedtime  nafcillin  IVPB  IV Intermittent   nafcillin  IVPB 2Gram(s) IV Intermittent every 4 hours  sodium chloride 0.9%. 1000milliLiter(s) IV Continuous <Continuous>  ferrous    sulfate 325milliGRAM(s) Oral daily  folic acid 1milliGRAM(s) Oral daily  multivitamin/minerals 1Tablet(s) Oral daily  insulin lispro Injectable (HumaLOG) 7Unit(s) SubCutaneous three times a day before meals  insulin glargine Injectable (LANTUS) 15Unit(s) SubCutaneous at bedtime    MEDICATIONS  (PRN):  oxyCODONE  5 mG/acetaminophen 325 mG 1Tablet(s) Oral every 6 hours PRN Moderate Pain (4 - 6)    Allergies  No Known Allergies    Review of systems:  some chest pain at the tube insertion site, no SOB, no fever, no nausea, no vomitting, no diarrhea, no diplopia, no dizziness.     Vital Signs Last 24 Hrs  T(C): 37, Max: 37 (03-30 @ 22:00)  T(F): 98.6, Max: 98.6 (03-30 @ 22:00)  HR: 82 (70 - 83)  BP: 114/68 (98/50 - 120/70)  BP(mean): --  RR: 18 (18 - 20)  SpO2: 97% (96% - 100%)    PHYSICAL EXAM:    Constitutional: NAD, well-groomed, well-developed  HEENT: PERRLA, EOMI, no exophalmos  Neck: No LAD, No JVD, trachea midline, no thyroid enlargement  Back: Normal spine flexure, No CVA tenderness  Respiratory: CTAB  Cardiovascular: S1 and S2, RRR, no M/G/R  Gastrointestinal: BS+, soft, no organomegaly  or mass  Extremities: No peripheral edema, no pedal lesions  Vascular: 2+ peripheral pulses  Neurological: A/O x 3, no focal deficits  Psychiatric: Normal mood, normal affect  Musculoskeletal: 5/5 strength b/l upper and lower extremities  Skin: No rashes    LABS:                        9.8    6.0   )-----------( 364      ( 30 Mar 2017 05:40 )             28.9     30 Mar 2017 05:40    133    |  97     |  8.0    ----------------------------<  241    3.5     |  25.0   |  0.40     Ca    8.3        30 Mar 2017 05:40            CAPILLARY BLOOD GLUCOSE  182 (31 Mar 2017 08:53)  257 (30 Mar 2017 22:00)  264 (30 Mar 2017 16:48)  171 (30 Mar 2017 12:30)  239 (30 Mar 2017 08:31)  220 (29 Mar 2017 22:40)  305 (29 Mar 2017 19:00)  260 (29 Mar 2017 16:45)  371 (29 Mar 2017 11:33)  113 (29 Mar 2017 08:38)  180 (28 Mar 2017 21:30)  374 (28 Mar 2017 17:30)  248 (28 Mar 2017 12:59)  181 (28 Mar 2017 08:00)  282 (27 Mar 2017 20:00)  209 (27 Mar 2017 17:00)  160 (27 Mar 2017 12:00)      RADIOLOGY & ADDITIONAL TESTS:

## 2017-03-31 NOTE — PROGRESS NOTE ADULT - ASSESSMENT
Dm2 uncontrolled but gettying better s/p decortication for MSSA empyema now doing well.   will change insulin to humulin 70/30 15 u BID for now and adjust doses based on Bgs  1800 ADA   check Bgs actid and hs.

## 2017-03-31 NOTE — DISCHARGE NOTE ADULT - MEDICATION SUMMARY - MEDICATIONS TO STOP TAKING
I will STOP taking the medications listed below when I get home from the hospital:    Lantus 100 units/mL subcutaneous solution  -- 20u  subcutaneous bedtime    albuterol 2.5 mg/3 mL (0.083%) inhalation solution  -- 3 milliliter(s) inhaled every 6 hours, As Needed    losartan 25 mg oral tablet  -- 1 tab(s) by mouth once a day

## 2017-03-31 NOTE — DISCHARGE NOTE ADULT - PLAN OF CARE
Resolution Diet and medications as prescribed.  Follow up with ID weekly Diet and medications as prescribed.  Follow up with thoracic surgery in 1 week Diet and medications as prescribed.  Follow up with Endocrinology Diet and medications as prescribed.  Follow up with PMD

## 2017-03-31 NOTE — DISCHARGE NOTE ADULT - CARE PLAN
Principal Discharge DX:	Pneumonia of right lower lobe due to methicillin susceptible Staphylococcus aureus (MSSA)  Goal:	Resolution  Instructions for follow-up, activity and diet:	Diet and medications as prescribed.  Follow up with ID weekly  Secondary Diagnosis:	Empyema lung  Instructions for follow-up, activity and diet:	Diet and medications as prescribed.  Follow up with thoracic surgery in 1 week  Secondary Diagnosis:	Type 2 diabetes mellitus with other specified complication, without long-term current use of insulin  Instructions for follow-up, activity and diet:	Diet and medications as prescribed.  Follow up with Endocrinology  Secondary Diagnosis:	Orthostatic hypotension  Instructions for follow-up, activity and diet:	Diet and medications as prescribed.  Follow up with PMD  Secondary Diagnosis:	Protein calorie malnutrition  Instructions for follow-up, activity and diet:	Diet and medications as prescribed.  Follow up with PMD

## 2017-03-31 NOTE — PROGRESS NOTE ADULT - ATTENDING COMMENTS
Will need IV Nafcillin till April 20th 2017  Will need weekly CBC and CMP faxed to [phone]  Appointment with us in 7 to 10 days - [phone]    Please call PRN. Will sign off
Will need long term IV antibiotics  No insurance  Discussed with [facility]    Dr Alvarez covering 03/30/17
Awaiting PICC, may discharge home therafter
Will need long term IV antibiotics  No insurance  Discussed with [facility]    Dr Alvarez covering 03/30/17
Will follow

## 2017-03-31 NOTE — DISCHARGE NOTE ADULT - PROVIDER TOKENS
TOKEN:'[id number]:'[id number]',FREE:[LAST:[[facility]],PHONE:[(   )    -],FAX:[(   )    -]]

## 2017-03-31 NOTE — DISCHARGE NOTE ADULT - HOSPITAL COURSE
41M who originally presented to [facility] for pneumonia and was found to have lung abscesses requiring transfer to [facility] for further management. The patient was continued on intravenous antibiotics for empyema. He was seen by Cardiothoracic Surgery in consultation with recommendation to pursue surgical intervention and possibly decortication. The patient was seen by Infectious Disease in consultation and continued on intravenous antibiotics. CT of the chest noted no evidence of mediastinal or hilar lymphadenopathy or pericardial effusion, noted a loculated right pleural effusion, pleural thickening surrounding the loculated effusion, consolidation of the right lower lobe, cavitation within the consolidated right lower lobe with small fluid pockets within the lung. HIV screen was noted to be negative and the patient was placed in isolation while awaiting further evaluation for possible tuberculosis.  S/p VATS, Decortication right lung (03/24/17), with clinical improvement. Sputum culture with MSSA sans bacteremia. Intra-operative cultures with rare Staph. aureus. Remains afebrile without leukocytosis. Both chest tubes removed from right chest.  Was treated for TB empirically. AFB stain negative, cultures pending, medications discontinued.  DMT2 uncontrolled, A1c >12, improving in-patient hyperglycemia initially with Insulin, which are being titrated in consultation with Endocrinology Dr Pedersen.  At risk for severe Protein Calorie Malnutrition given low albumin, on Supplements  Anemia, Normocytic appears stable, given supplements  Hypertension - hypotensive after VATS, requiring pressors, eventually weaned, episodes of postural hypotension resolved with fluid resuscitation. No longer needing antihypertensive.    Arrangements are being made for IV Nafcillin at home (through 04/20/17).  Patient will need weekly CBC, BMP weekly.    Discharge time - 45 minutes

## 2017-03-31 NOTE — PROCEDURE NOTE - NSSITEPREP_SKIN_A_CORE
chlorhexidine/Adherence to aseptic technique: hand hygiene prior to donning barriers (gown, gloves), don cap and mask, sterile drape over patient

## 2017-03-31 NOTE — PROGRESS NOTE ADULT - NSHPATTENDINGPLANDISCUSS_GEN_ALL_CORE
Dr Hein
[facility], ID
CTsx, Endo, ID
Endocrinology, ID, CCC
Endocrinology, ID, CCC
DR. BUNN
icu team
Dr Pacheco

## 2017-03-31 NOTE — DISCHARGE NOTE ADULT - MEDICATION SUMMARY - MEDICATIONS TO TAKE
I will START or STAY ON the medications listed below when I get home from the hospital:    Weekly Labs  -- 1 dose(s) intravenous once a week MDD:Weekly CBC and CMP till April 20th 2017  -- Indication: For labs    HumuLIN 70/30 subcutaneous suspension  -- 15 unit(s) subcutaneous 2 times a day  -- Indication: For Diabetes    ferrous sulfate 325 mg (65 mg elemental iron) oral tablet  -- 1 tab(s) by mouth 2 times a day  -- Indication: For Anemia    docusate sodium 100 mg oral capsule  -- 1 cap(s) by mouth 3 times a day  -- Indication: For constipation    nafcillin 2 g/100 mL intravenous solution  -- 2 gram(s) intravenous every 4 hours MDD:End Date April 20th 2017  -- Indication: For Empyema lung    Multiple Vitamins with Minerals oral tablet  -- 1 tab(s) by mouth once a day  -- Indication: For Supplement    folic acid 1 mg oral tablet  -- 1 tab(s) by mouth once a day  -- Indication: For Supplement

## 2017-03-31 NOTE — DISCHARGE NOTE ADULT - COMMUNITY RESOURCES
Appointment: [facility] in [place] April 3, 2017 at 9:45am  /////   Appointment: Dr Pauline Castorena April 20, 2017 at 2:00pm

## 2017-04-01 VITALS
RESPIRATION RATE: 16 BRPM | TEMPERATURE: 99 F | DIASTOLIC BLOOD PRESSURE: 68 MMHG | SYSTOLIC BLOOD PRESSURE: 118 MMHG | HEART RATE: 81 BPM

## 2017-04-01 LAB
ANION GAP SERPL CALC-SCNC: 9 MMOL/L — SIGNIFICANT CHANGE UP (ref 5–17)
BUN SERPL-MCNC: 6 MG/DL — LOW (ref 8–20)
CALCIUM SERPL-MCNC: 8.3 MG/DL — LOW (ref 8.6–10.2)
CHLORIDE SERPL-SCNC: 97 MMOL/L — LOW (ref 98–107)
CO2 SERPL-SCNC: 25 MMOL/L — SIGNIFICANT CHANGE UP (ref 22–29)
CREAT SERPL-MCNC: 0.34 MG/DL — LOW (ref 0.5–1.3)
GLUCOSE SERPL-MCNC: 250 MG/DL — HIGH (ref 70–115)
HCT VFR BLD CALC: 26.4 % — LOW (ref 42–52)
HGB BLD-MCNC: 9.4 G/DL — LOW (ref 14–18)
MAGNESIUM SERPL-MCNC: 1.8 MG/DL — SIGNIFICANT CHANGE UP (ref 1.8–2.5)
MCHC RBC-ENTMCNC: 29.7 PG — SIGNIFICANT CHANGE UP (ref 27–31)
MCHC RBC-ENTMCNC: 35.6 G/DL — SIGNIFICANT CHANGE UP (ref 32–36)
MCV RBC AUTO: 83.3 FL — SIGNIFICANT CHANGE UP (ref 80–94)
PLATELET # BLD AUTO: 417 K/UL — HIGH (ref 150–400)
POTASSIUM SERPL-MCNC: 3.9 MMOL/L — SIGNIFICANT CHANGE UP (ref 3.5–5.3)
POTASSIUM SERPL-SCNC: 3.9 MMOL/L — SIGNIFICANT CHANGE UP (ref 3.5–5.3)
RBC # BLD: 3.17 M/UL — LOW (ref 4.6–6.2)
RBC # FLD: 13 % — SIGNIFICANT CHANGE UP (ref 11–15.6)
SODIUM SERPL-SCNC: 131 MMOL/L — LOW (ref 135–145)
WBC # BLD: 5.1 K/UL — SIGNIFICANT CHANGE UP (ref 4.8–10.8)
WBC # FLD AUTO: 5.1 K/UL — SIGNIFICANT CHANGE UP (ref 4.8–10.8)

## 2017-04-01 PROCEDURE — 86803 HEPATITIS C AB TEST: CPT

## 2017-04-01 PROCEDURE — 86703 HIV-1/HIV-2 1 RESULT ANTBDY: CPT

## 2017-04-01 PROCEDURE — 71260 CT THORAX DX C+: CPT

## 2017-04-01 PROCEDURE — 99285 EMERGENCY DEPT VISIT HI MDM: CPT

## 2017-04-01 PROCEDURE — 86738 MYCOPLASMA ANTIBODY: CPT

## 2017-04-01 PROCEDURE — 87116 MYCOBACTERIA CULTURE: CPT

## 2017-04-01 PROCEDURE — 83605 ASSAY OF LACTIC ACID: CPT

## 2017-04-01 PROCEDURE — 86480 TB TEST CELL IMMUN MEASURE: CPT

## 2017-04-01 PROCEDURE — 93005 ELECTROCARDIOGRAM TRACING: CPT

## 2017-04-01 PROCEDURE — 86920 COMPATIBILITY TEST SPIN: CPT

## 2017-04-01 PROCEDURE — 83036 HEMOGLOBIN GLYCOSYLATED A1C: CPT

## 2017-04-01 PROCEDURE — 87040 BLOOD CULTURE FOR BACTERIA: CPT

## 2017-04-01 PROCEDURE — 87015 SPECIMEN INFECT AGNT CONCNTJ: CPT

## 2017-04-01 PROCEDURE — 86631 CHLAMYDIA ANTIBODY: CPT

## 2017-04-01 PROCEDURE — 87206 SMEAR FLUORESCENT/ACID STAI: CPT

## 2017-04-01 PROCEDURE — 85027 COMPLETE CBC AUTOMATED: CPT

## 2017-04-01 PROCEDURE — 82330 ASSAY OF CALCIUM: CPT

## 2017-04-01 PROCEDURE — 85610 PROTHROMBIN TIME: CPT

## 2017-04-01 PROCEDURE — 71045 X-RAY EXAM CHEST 1 VIEW: CPT

## 2017-04-01 PROCEDURE — 86901 BLOOD TYPING SEROLOGIC RH(D): CPT

## 2017-04-01 PROCEDURE — T1013: CPT

## 2017-04-01 PROCEDURE — 87186 SC STD MICRODIL/AGAR DIL: CPT

## 2017-04-01 PROCEDURE — P9045: CPT

## 2017-04-01 PROCEDURE — 87075 CULTR BACTERIA EXCEPT BLOOD: CPT

## 2017-04-01 PROCEDURE — 80076 HEPATIC FUNCTION PANEL: CPT

## 2017-04-01 PROCEDURE — 36415 COLL VENOUS BLD VENIPUNCTURE: CPT

## 2017-04-01 PROCEDURE — 85730 THROMBOPLASTIN TIME PARTIAL: CPT

## 2017-04-01 PROCEDURE — 87205 SMEAR GRAM STAIN: CPT

## 2017-04-01 PROCEDURE — 84100 ASSAY OF PHOSPHORUS: CPT

## 2017-04-01 PROCEDURE — 99232 SBSQ HOSP IP/OBS MODERATE 35: CPT

## 2017-04-01 PROCEDURE — 80202 ASSAY OF VANCOMYCIN: CPT

## 2017-04-01 PROCEDURE — 87070 CULTURE OTHR SPECIMN AEROBIC: CPT

## 2017-04-01 PROCEDURE — 80053 COMPREHEN METABOLIC PANEL: CPT

## 2017-04-01 PROCEDURE — 81003 URINALYSIS AUTO W/O SCOPE: CPT

## 2017-04-01 PROCEDURE — 88305 TISSUE EXAM BY PATHOLOGIST: CPT

## 2017-04-01 PROCEDURE — 86850 RBC ANTIBODY SCREEN: CPT

## 2017-04-01 PROCEDURE — 87102 FUNGUS ISOLATION CULTURE: CPT

## 2017-04-01 PROCEDURE — 84134 ASSAY OF PREALBUMIN: CPT

## 2017-04-01 PROCEDURE — 86900 BLOOD TYPING SEROLOGIC ABO: CPT

## 2017-04-01 PROCEDURE — 83735 ASSAY OF MAGNESIUM: CPT

## 2017-04-01 PROCEDURE — 87086 URINE CULTURE/COLONY COUNT: CPT

## 2017-04-01 PROCEDURE — 80048 BASIC METABOLIC PNL TOTAL CA: CPT

## 2017-04-01 RX ORDER — INSULIN NPH HUM/REG INSULIN HM 70-30/ML
15 VIAL (ML) SUBCUTANEOUS
Qty: 1 | Refills: 0 | OUTPATIENT
Start: 2017-04-01

## 2017-04-01 RX ADMIN — Medication 325 MILLIGRAM(S): at 12:25

## 2017-04-01 RX ADMIN — Medication 1 MILLIGRAM(S): at 12:25

## 2017-04-01 RX ADMIN — Medication 7 UNIT(S): at 09:11

## 2017-04-01 RX ADMIN — NAFCILLIN 200 GRAM(S): 10 INJECTION, POWDER, FOR SOLUTION INTRAVENOUS at 02:10

## 2017-04-01 RX ADMIN — NAFCILLIN 200 GRAM(S): 10 INJECTION, POWDER, FOR SOLUTION INTRAVENOUS at 05:04

## 2017-04-01 RX ADMIN — Medication 1 TABLET(S): at 12:25

## 2017-04-01 RX ADMIN — Medication 100 MILLIGRAM(S): at 13:00

## 2017-04-01 RX ADMIN — Medication 2: at 09:10

## 2017-04-01 RX ADMIN — NAFCILLIN 200 GRAM(S): 10 INJECTION, POWDER, FOR SOLUTION INTRAVENOUS at 10:09

## 2017-04-01 RX ADMIN — Medication 7 UNIT(S): at 13:00

## 2017-04-01 NOTE — PROGRESS NOTE ADULT - PROBLEM SELECTOR PROBLEM 2
Abscess of lower lobe of right lung with pneumonia
Abscess of lower lobe of right lung with pneumonia
Staph aureus infection
Type 2 diabetes mellitus with other skin complication
Abscess of lower lobe of right lung with pneumonia
Abscess of lower lobe of right lung with pneumonia
Empyema of pleural space
Staph aureus infection
Tuberculosis
Type 2 diabetes mellitus with other skin complication

## 2017-04-01 NOTE — PROGRESS NOTE ADULT - PROBLEM SELECTOR PLAN 4
Supplements  Nutrition consult

## 2017-04-01 NOTE — PROGRESS NOTE ADULT - PROBLEM SELECTOR PLAN 1
Continue Nafcillin to cover MSSA  Follow up cultures  Chest tube removed  Blood cultures no growth  Plan for PICC  Will need IV Nafcillin till April 20th 2017  Will need weekly CBC and CMP faxed to [phone]  Appointment with us in 7 to 10 days - [phone]
Continue Rifampin, Isoniazid, Pyrazinamide, Ethambutol, Meropenem. Maintain contact precaution.
Continue antibiotics per ID  Follow up cultures, QFG.  Chest tube management per CT Surgery
Continue antibiotics per ID  Follow up cultures, QFG.  Chest tube management per CT Surgery
Continue antibiotics per ID  Follow up cultures, QFG.  Chest tube management per CT Surgery - to be removed later today as per discussion with PA
Continue antibiotics per ID.  Changed to nafcillin by ID - ?duration ?4 weeks  Follow up cultures, QFG.
Continue antibiotics per ID.  Changed to nafcillin by ID - ?duration ?4 weeks  Follow up cultures, QFG.
Continue antibiotics per ID.  Changed to nafcillin till 04/20/17  Follow up cultures AFB
Continue antibiotics per ID.  Changed to nafcillin till 04/20/17  Follow up cultures AFB
Continue meropenem per ID. AFB negative to date
CONT MERREM FOR NOW
Continue Rifampin, Isoniazid, Pyrazinamide, Ethambutol, Meropenem. Maintain contact precaution.
Continue antibiotics per ID  Follow up cultures, QFG.  Chest tube management per CT Surgery
MERREM AND ANTI TB MEDS
Second chest tube removed today.  Continue IV antibiotics per ID  Transfer to Medicine Service  + stitch to remain in place ~ a week. (can follow up with Dr Cervantes in a week in the office, or we will see again if remains in house for a week)  FU CXR
will change meropenem to Nafcillin to cover MSSA  Follow up cultures  Chest tube still in place  Blood cultures no growth

## 2017-04-01 NOTE — PROGRESS NOTE ADULT - PROVIDER SPECIALTY LIST ADULT
Anesthesia
CT Surgery
Endocrinology
Hospitalist
Infectious Disease
Thoracic Surgery
Infectious Disease

## 2017-04-01 NOTE — PROGRESS NOTE ADULT - PROBLEM SELECTOR PROBLEM 5
Prophylactic measure

## 2017-04-01 NOTE — PROGRESS NOTE ADULT - SUBJECTIVE AND OBJECTIVE BOX
HOSPITALIST PROGRESS NOTE    LYNN MELISSA  [id number]  41yMale    Patient is a 41y old  Male who presents with a chief complaint of fever, chills, cough (31 Mar 2017 16:15)      SUBJECTIVE: Chart reviewed since last visit. Patient seen and examined at bedside for MSSA pneumonia empyema.  Denies any dizziness, chest pain or fever.  Minimal cough - no phlegm.      OBJECTIVE:  Vital Signs Last 24 Hrs  T(C): 37.1, Max: 37.1 (03-31 @ 12:29)  T(F): 98.7, Max: 98.8 (03-31 @ 12:33)  HR: 81 (80 - 84)  BP: 118/68 (94/66 - 122/80)  RR: 16 (16 - 17)  SpO2: 97% (97% - 99%)    PHYSICAL EXAMINATION  General: NAD[+]   nontoxic[+]    HEENT: AT/NC[+]  PERRLA[+]    NECK: Supple[+]    CVS: RRR[+]   S1+S2[+]  Dressing Right chest.  RESP: Fair air entry bilaterally[+]except Right lower lung with improved sounds  Crackles[+]RLL.   GI: Soft[+]  Nondistended[+]   Nontender[+]   Bowel Sounds[+]    : suprapubic tenderness[-]     MS: FROM[+]   Edema[-]  CNS: AAOx3[+]     INTEG: Skin is Warm[+]    PSYCH: Fair Mood, Affect      CAPILLARY BLOOD GLUCOSE  142 (01 Apr 2017 12:04)  176 (01 Apr 2017 08:20)  194 (31 Mar 2017 21:24)  249 (31 Mar 2017 18:44)  361 (31 Mar 2017 12:33)        I&O's Summary  I & Os for 24h ending 01 Apr 2017 07:00  =============================================  IN: 580 ml / OUT: 700 ml / NET: -120 ml    I & Os for current day (as of 01 Apr 2017 12:26)  =============================================  IN: 240 ml / OUT: 350 ml / NET: -110 ml                          9.4    5.1   )-----------( 417      ( 01 Apr 2017 05:22 )             26.4       01 Apr 2017 05:22    131    |  97     |  6.0    ----------------------------<  250    3.9     |  25.0   |  0.34     Ca    8.3        01 Apr 2017 05:22  Mg     1.8       01 Apr 2017 05:22            MEDICATIONS  (STANDING):  enoxaparin Injectable 40milliGRAM(s) SubCutaneous every 24 hours  docusate sodium 100milliGRAM(s) Oral three times a day  pyridoxine 100milliGRAM(s) Oral daily  insulin lispro (HumaLOG) corrective regimen sliding scale  SubCutaneous Before meals and at bedtime  nafcillin  IVPB  IV Intermittent   nafcillin  IVPB 2Gram(s) IV Intermittent every 4 hours  sodium chloride 0.9%. 1000milliLiter(s) IV Continuous <Continuous>  ferrous    sulfate 325milliGRAM(s) Oral daily  folic acid 1milliGRAM(s) Oral daily  multivitamin/minerals 1Tablet(s) Oral daily  insulin lispro Injectable (HumaLOG) 7Unit(s) SubCutaneous three times a day before meals  insulin glargine Injectable (LANTUS) 15Unit(s) SubCutaneous at bedtime      MEDICATIONS  (PRN):  oxyCODONE  5 mG/acetaminophen 325 mG 1Tablet(s) Oral every 6 hours PRN Moderate Pain (4 - 6)

## 2017-04-01 NOTE — PROGRESS NOTE ADULT - ASSESSMENT
41M who originally presented to [facility] for pneumonia and was found to have lung abscesses requiring transfer to [facility] for further management. The patient was continued on intravenous antibiotics for empyema. He was seen by Cardiothoracic Surgery in consultation with recommendation to pursue surgical intervention and possibly decortication. The patient was seen by Infectious Disease in consultation and continued on intravenous antibiotics. CT of the chest noted no evidence of mediastinal or hilar lymphadenopathy or pericardial effusion, noted a loculated right pleural effusion, pleural thickening surrounding the loculated effusion, consolidation of the right lower lobe, cavitation within the consolidated right lower lobe with small fluid pockets within the lung. HIV screen was noted to be negative and the patient was placed in isolation while awaiting further evaluation for possible tuberculosis.  S/p VATS, Decortication right lung (03/24/17), with clinical improvement. Sputum culture with MSSA sans bacteremia. Intra-operative cultures with rare Staph. aureus. Remains afebrile without leukocytosis. Both chest tubes removed from right chest.  Was treated for TB empirically. AFB stain negative, cultures pending, medications discontinued.  DMT2 uncontrolled, A1c >12, improving in-patient hyperglycemia initially with Insulin, however now with hyperglycemia again  At risk for severe Protein Calorie Malnutrition given low albumin, on Supplements  Anemia, Normocytic appears stable.  Hypertension - currently normotensive, no longer having orthostatic hypotension.    Patient has been medically stable and been discharged as of 03/31/17, awaiting set up of home IV antibiotics.

## 2017-04-01 NOTE — PROGRESS NOTE ADULT - PROBLEM SELECTOR PROBLEM 4
Protein calorie malnutrition

## 2017-04-01 NOTE — PROGRESS NOTE ADULT - PROBLEM SELECTOR PLAN 2
Continue Nafcillin as above  Blood cultures no growth
Continue basal Insulin, mealtime Insulins
Continue basal Insulin, mealtime Insulins
Increase basal Insulin, mealtime Insulins
Keep chest tubes to suction per Dr. Cervantes. Daily CXR.
Keep chest tubes to suction per Dr. Cervantes. Daily CXR.
Maintain basal Insulin  Continue mealtime Insulins
Maintain basal Insulin  Continue mealtime Insulins
Maintain basal Insulin  Increase mealtime Insulins
Maintain basal Insulin  Increase mealtime Insulins
AWAITING PATH FOR ARB  STAPH FOR NOW
BASED ON QF GOLD  SPUTA X 3 NEG  CONTINUE ISOLATION
Continue Nafcillin as above
Continue basal Insulin  Add mealtime Insulins
Keep chest tubes to suction per Dr. Cervantes. Daily CXR.

## 2017-04-01 NOTE — PROGRESS NOTE ADULT - PROBLEM SELECTOR PROBLEM 1
Empyema lung
Empyema of pleural space
Tuberculosis
Tuberculosis
Abscess of lower lobe of right lung with pneumonia
Empyema lung
Empyema of pleural space
Empyema of pleural space
Staph aureus infection
Tuberculosis
Tuberculosis

## 2017-04-01 NOTE — PROGRESS NOTE ADULT - PROBLEM SELECTOR PROBLEM 3
Essential hypertension
Orthostatic hypotension
Orthostatic hypotension
R/O Tuberculosis
Empyema lung
Essential hypertension
Orthostatic hypotension
R/O Tuberculosis

## 2017-04-18 LAB
CULTURE RESULTS: SIGNIFICANT CHANGE UP
CULTURE RESULTS: SIGNIFICANT CHANGE UP
SPECIMEN SOURCE: SIGNIFICANT CHANGE UP
SPECIMEN SOURCE: SIGNIFICANT CHANGE UP

## 2017-04-20 ENCOUNTER — APPOINTMENT (OUTPATIENT)
Dept: INTERNAL MEDICINE | Facility: CLINIC | Age: 42
End: 2017-04-20

## 2017-05-22 ENCOUNTER — APPOINTMENT (OUTPATIENT)
Dept: INTERNAL MEDICINE | Facility: CLINIC | Age: 42
End: 2017-05-22

## 2017-07-13 ENCOUNTER — APPOINTMENT (OUTPATIENT)
Dept: INTERNAL MEDICINE | Facility: CLINIC | Age: 42
End: 2017-07-13

## 2017-08-19 RX ORDER — INSULIN NPH HUM/REG INSULIN HM 70-30/ML
15 VIAL (ML) SUBCUTANEOUS
Qty: 0 | Refills: 0 | COMMUNITY

## 2017-08-19 RX ORDER — ALBUTEROL 90 UG/1
3 AEROSOL, METERED ORAL
Qty: 0 | Refills: 0 | COMMUNITY

## 2017-08-19 RX ORDER — LOSARTAN POTASSIUM 100 MG/1
1 TABLET, FILM COATED ORAL
Qty: 0 | Refills: 0 | COMMUNITY

## 2017-08-19 RX ORDER — INSULIN GLARGINE 100 [IU]/ML
0 INJECTION, SOLUTION SUBCUTANEOUS
Qty: 0 | Refills: 0 | COMMUNITY

## 2017-09-21 ENCOUNTER — APPOINTMENT (OUTPATIENT)
Dept: INTERNAL MEDICINE | Facility: CLINIC | Age: 42
End: 2017-09-21
Payer: MEDICAID

## 2017-09-21 PROCEDURE — 90686 IIV4 VACC NO PRSV 0.5 ML IM: CPT

## 2017-09-21 PROCEDURE — 99213 OFFICE O/P EST LOW 20 MIN: CPT | Mod: 25

## 2017-09-21 PROCEDURE — G0008: CPT

## 2017-11-01 ENCOUNTER — APPOINTMENT (OUTPATIENT)
Dept: INTERNAL MEDICINE | Facility: CLINIC | Age: 42
End: 2017-11-01
Payer: MEDICAID

## 2017-11-01 PROCEDURE — 99214 OFFICE O/P EST MOD 30 MIN: CPT | Mod: 25

## 2017-11-01 PROCEDURE — 36415 COLL VENOUS BLD VENIPUNCTURE: CPT

## 2017-11-27 ENCOUNTER — APPOINTMENT (OUTPATIENT)
Dept: INTERNAL MEDICINE | Facility: CLINIC | Age: 42
End: 2017-11-27

## 2017-12-23 ENCOUNTER — EMERGENCY (EMERGENCY)
Facility: HOSPITAL | Age: 42
LOS: 1 days | End: 2017-12-23
Payer: MEDICAID

## 2017-12-23 PROCEDURE — 99284 EMERGENCY DEPT VISIT MOD MDM: CPT | Mod: 25

## 2018-01-02 ENCOUNTER — APPOINTMENT (OUTPATIENT)
Dept: INTERNAL MEDICINE | Facility: CLINIC | Age: 43
End: 2018-01-02
Payer: MEDICAID

## 2018-01-02 PROCEDURE — 99213 OFFICE O/P EST LOW 20 MIN: CPT

## 2018-04-04 ENCOUNTER — APPOINTMENT (OUTPATIENT)
Dept: INTERNAL MEDICINE | Facility: CLINIC | Age: 43
End: 2018-04-04

## 2018-10-02 NOTE — BRIEF OPERATIVE NOTE - TYPE OF ANESTHESIA
General Instructions (Optional): Insurance benefits will be check and patient will be called with cost, then schedule for appt Detail Level: Detailed

## 2019-09-16 NOTE — CONSULT NOTE ADULT - SUBJECTIVE AND OBJECTIVE BOX
40 y/o M w/hx recent dx new onset dm. Pt was transferred from [place] due to several areas right lung abscess, and moderate size loculated empyema. Pt was tx w/vanco/meropenem... Pt states that he was admitted x 10 dys. he came w/ hx of fever same evolution, and chills, associated w. productive cough, yellowish..and pleuritic cp, everytime he coughs... he also states loosing approx 10 lbs during the past month.. Denies sob,abd pain, dysuria, palpitations, weakness, headache, diarrhea or any further complaints.     Surgeon: Ashok cervantes        PAST MEDICAL & SURGICAL HISTORY:  Diabetes  No significant past surgical history      MEDICATIONS  (STANDING):  insulin glargine Injectable (LANTUS) 20Unit(s) SubCutaneous at bedtime  insulin lispro (HumaLOG) corrective regimen sliding scale  SubCutaneous Before meals and at bedtime  dextrose 5%. 1000milliLiter(s) IV Continuous <Continuous>  dextrose 50% Injectable 12.5Gram(s) IV Push once  dextrose 50% Injectable 25Gram(s) IV Push once  dextrose 50% Injectable 25Gram(s) IV Push once  enoxaparin Injectable 40milliGRAM(s) SubCutaneous every 24 hours    MEDICATIONS  (PRN):  dextrose Gel 1Dose(s) Oral once PRN Blood Glucose LESS THAN 70 milliGRAM(s)/deciliter  glucagon  Injectable 1milliGRAM(s) IntraMuscular once PRN Glucose LESS THAN 70 milligrams/deciliter  chlordiazePOXIDE 50milliGRAM(s) Oral every 1 hour PRN CIWA-Ar score 8 or greater    Antiplatelet therapy:                           Last dose/amt:    Allergies    No Known Allergies    Intolerances        SOCIAL HISTORY:  Smoker: [ ] Yes  [x ] No        PACK YEARS:                         WHEN QUIT?  ETOH use: [x ] Yes  [ ] No              FREQUENCY / QUANTITY: 6 pack daily   Ilicit Drug use:  [ ] Yes  [x ] No  Occupation: day labour   Live with: with family  Assisted device use:  none    FAMILY HISTORY:  No pertinent family history in first degree relatives      Review of Systems  CONSTITUTIONAL:  Fevers[x ] chills[ ] sweats[x ] fatigue[x ] weight loss[x ] weight gain [ ]                                     NEGATIVE [ ]   NEURO:  parathesias[ ] seizures [ ]  syncope [ ]  confusion [ ]                                                                                NEGATIVE[x ]   EYES: glasses[ ]  blurry vision[ ]  discharge[ ] pain[ ] glaucoma [ ]                                                                          NEGATIVE[x ]   ENMT:  difficulty hearing [ ]  vertigo[ ]  dysphagia[ ] epistaxis[ ] recent dental work [ ]                                    NEGATIVE[x ]   CV:  chest pain[x ] palpitations[x ] LUNA [x ] diaphoresis [ ]                                                                                           NEGATIVE[ ]   RESPIRATORY:  wheezing[ ] SOB[x ] cough [x ] sputum[ ] hemoptysis[ ]                                                                  NEGATIVE[ ]   GI:  nausea[ ]  vommiting [ ]  diarrhea[ ] constipation [ ] melena [ ]                                                                      NEGATIVE[x ]   : hematuria[ ]  dysuria[ ] urgency[ ] incontinence[ ]                                                                                            NEGATIVE[x ]   MUSKULOSKELETAL:  arthritis[ ]  joint swelling [ ] muscle weakness [ ]                                                                NEGATIVE[x ]   SKIN/BREAST:  rash[ ] itching [ ]  hair loss[ ] masses[ ]                                                                                              NEGATIVE[x ]   PSYCH:  dementia [ ] depresion [ ] anxiety[ ]                                                                                                               NEGATIVE[x ]   HEME/LYMPH:  bruises easily[ ] enlarged lymph nodes[ ] tender lymph nodes[ ]                                               NEGATIVE[ x]   ENDOCRINE:  cold intolerance[ ] heat intolerance[ ] polydipsia[ ]                                                                          NEGATIVE[x ]     PHYSICAL EXAM  Vital Signs Last 24 Hrs  T(C): 36.7, Max: 36.7 (03-22 @ 18:33)  T(F): 98.1, Max: 98.1 (03-22 @ 18:33)  HR: 91 (67 - 91)  BP: 110/78 (81/53 - 110/78)  BP(mean): --  RR: 16 (16 - 16)  SpO2: 97% (97% - 98%)    CONSTITUTIONAL:                                                                          WNL[ ]   Neuro: WNL[x ] Normal exam oriented to person/place & time with no focal motor or sensory  deficits. Other __________                    Eyes: WNL[ x]   Normal exam of conjunctiva & lids, pupils equally reactive. Other______________________________     ENT: WNL[x ]    Normal exam of nasal/oral mucosa with absence of cyanosis. Other_____________________________  Neck: WNL[x ]  Normal exam of jugular veins, trachea & thyroid. Other_________________________________________  Chest: WNL[ ] Normal lung exam with good air movement absence of wheezes, rales, or rhonchi: Other_________diminished right lower lobe , no rales.________________________________                                                                                CV:  Auscultation: normal [x ] S3[ ] S4[ ] Irregular [ ] Rub[ ] Clicks[ ]    Murmurs none:[ x]systolic [ ]  diastolic [ ] holosystolic [ ]  Carotids: No Bruits[x ] Other____________ Abdominal Aorta: normal [ ] nonpalpable[ ]Other___________                                                                                      GI: WNL[x ] Normal exam of abdomen, liver & spleen with no noted masses or tenderness. Other______________________                                                                                                        Extremities: WNLx[ ] Normal no evidence of cyanosis or deformity Edema: none[ ]trace[ ]1+[ ]2+[ ]3+[ ]4+[ ]  Lower Extremity Pulses: Right[ ] Left[ ]Varicosities[ ]  SKIN :WNL[x ] Normal exam to inspection & palation. Other:____________________                                                          LABS:                        12.1   8.8   )-----------( 503      ( 22 Mar 2017 22:09 )             35.9     22 Mar 2017 22:09    137    |  99     |  9.0    ----------------------------<  150    3.8     |  27.0   |  0.48     Ca    8.3        22 Mar 2017 22:09    TPro  8.0    /  Alb  2.3    /  TBili  0.2    /  DBili  x      /  AST  42     /  ALT  143    /  AlkPhos  207    22 Mar 2017 22:09    PT/INR - ( 22 Mar 2017 22:09 )   PT: 12.0 sec;   INR: 1.09 ratio         PTT - ( 22 Mar 2017 22:09 )  PTT:29.9 sec  Assessment:    40 y/o M w/hx recent dx new onset dm. Pt was transferred from [place] due to several areas right lung abscess, and moderate size loculated empyema. Pt was tx w/vanco/meropenem... Pt states that he was admitted x 10 dys. he came w/ hx of fever same evolution, and chills, associated w. productive cough, yellowish..and pleuritic cp, everytime he coughs... he also states loosing approx 10 lbs during the past month.. Denies sob,abd pain, dysuria, palpitations, weakness, headache, diarrhea or any further complaints.       Plan:  pan culture   ABx  admission labs  ID consult  right lung decoration /vats prob friday.    d/w Dr SAMUEL Cervantes 2 = assistive person

## 2019-12-03 ENCOUNTER — EMERGENCY (EMERGENCY)
Facility: HOSPITAL | Age: 44
LOS: 1 days | End: 2019-12-03
Admitting: EMERGENCY MEDICINE
Payer: COMMERCIAL

## 2019-12-03 PROCEDURE — 71045 X-RAY EXAM CHEST 1 VIEW: CPT | Mod: 26

## 2019-12-03 PROCEDURE — 99284 EMERGENCY DEPT VISIT MOD MDM: CPT

## 2019-12-04 PROCEDURE — 74177 CT ABD & PELVIS W/CONTRAST: CPT | Mod: 26

## 2020-12-03 NOTE — DISCHARGE NOTE ADULT - FUNCTIONAL STATUS DATE
Patient Name: Simone Daniels  Medical Record Number: [id number]  Today's Date: 12/3/2020    Procedure: Gastrostomy Tube Placement  Proceduralist: Dr. Mariano    Procedure Start: 1340  Procedure end: 1355  Sedation medications administered: 1 mg Versed, 50     Report given to: 6A RN    Other Notes: Pt arrived to IR room 1 from 6A. Consent reviewed. Pt denies any questions or concerns regarding procedure. Pt positioned supine and monitored per protocol. Pt tolerated procedure without any noted complications. Pt transferred back to .     31-Mar-2017

## 2020-12-15 ENCOUNTER — EMERGENCY (EMERGENCY)
Facility: HOSPITAL | Age: 45
LOS: 1 days | End: 2020-12-15
Admitting: EMERGENCY MEDICINE
Payer: MEDICAID

## 2020-12-15 PROCEDURE — 74019 RADEX ABDOMEN 2 VIEWS: CPT | Mod: 26

## 2020-12-15 PROCEDURE — 99284 EMERGENCY DEPT VISIT MOD MDM: CPT

## 2020-12-31 ENCOUNTER — EMERGENCY (EMERGENCY)
Facility: HOSPITAL | Age: 45
LOS: 1 days | End: 2020-12-31
Admitting: EMERGENCY MEDICINE
Payer: COMMERCIAL

## 2020-12-31 PROCEDURE — 99285 EMERGENCY DEPT VISIT HI MDM: CPT

## 2021-01-01 PROCEDURE — 71045 X-RAY EXAM CHEST 1 VIEW: CPT | Mod: 26

## 2021-01-01 PROCEDURE — 93010 ELECTROCARDIOGRAM REPORT: CPT

## 2022-01-22 PROBLEM — E11.9 TYPE 2 DIABETES MELLITUS WITHOUT COMPLICATIONS: Chronic | Status: ACTIVE | Noted: 2017-03-22

## 2024-10-13 NOTE — PATIENT PROFILE ADULT. - FUNCTIONAL SCREEN CURRENT LEVEL: BATHING, MLM
Bed/Stretcher in lowest position, wheels locked, appropriate side rails in place/Call bell, personal items and telephone in reach/Instruct patient to call for assistance before getting out of bed/chair/stretcher/Non-slip footwear applied when patient is off stretcher/[place] to call system/Physically safe environment - no spills, clutter or unnecessary equipment/Purposeful proactive rounding/Room/bathroom lighting operational, light cord in reach (0) independent

## 2025-04-04 ENCOUNTER — APPOINTMENT (OUTPATIENT)
Dept: CT IMAGING | Facility: CLINIC | Age: 50
End: 2025-04-04
Payer: SELF-PAY

## 2025-04-04 ENCOUNTER — APPOINTMENT (OUTPATIENT)
Dept: ULTRASOUND IMAGING | Facility: CLINIC | Age: 50
End: 2025-04-04
Payer: COMMERCIAL

## 2025-04-04 PROCEDURE — 93880 EXTRACRANIAL BILAT STUDY: CPT

## 2025-04-04 PROCEDURE — 75571 CT HRT W/O DYE W/CA TEST: CPT
